# Patient Record
Sex: FEMALE | Race: WHITE | Employment: PART TIME | URBAN - METROPOLITAN AREA
[De-identification: names, ages, dates, MRNs, and addresses within clinical notes are randomized per-mention and may not be internally consistent; named-entity substitution may affect disease eponyms.]

---

## 2018-06-01 ENCOUNTER — OFFICE VISIT (OUTPATIENT)
Dept: URGENT CARE | Age: 43
End: 2018-06-01

## 2018-06-01 VITALS
BODY MASS INDEX: 32.3 KG/M2 | TEMPERATURE: 97.3 F | OXYGEN SATURATION: 98 % | RESPIRATION RATE: 16 BRPM | WEIGHT: 201 LBS | HEIGHT: 66 IN | HEART RATE: 84 BPM | DIASTOLIC BLOOD PRESSURE: 77 MMHG | SYSTOLIC BLOOD PRESSURE: 124 MMHG

## 2018-06-01 DIAGNOSIS — L21.9 SEBORRHEA: Primary | ICD-10-CM

## 2018-06-01 RX ORDER — NYSTATIN AND TRIAMCINOLONE ACETONIDE 100000; 1 [USP'U]/G; MG/G
OINTMENT TOPICAL 2 TIMES DAILY
Qty: 15 G | Refills: 0 | Status: SHIPPED | OUTPATIENT
Start: 2018-06-01 | End: 2018-12-26

## 2018-06-01 NOTE — MR AVS SNAPSHOT
Kurt Abdullahi [place]u [address] 
[phone] Patient: Romana Abad MRN: [id number] :1975 Visit Information Date & Time Provider Department Dept. Phone Encounter #  
 2018  9:00 AM [address] [facility] [phone] [id number] Follow-up Instructions Return if symptoms worsen or fail to improve, for Follow up with PCP. Upcoming Health Maintenance Date Due DTaP/Tdap/Td series (1 - Tdap) 1998 PAP AKA CERVICAL CYTOLOGY 1998 Influenza Age 5 to Adult 2018 Allergies as of 2018  Review Complete On: 2018 By: Vincent Hayden RN No Known Allergies Current Immunizations  Never Reviewed No immunizations on file. Not reviewed this visit You Were Diagnosed With   
  
 Codes Comments Seborrhea    -  Primary ICD-10-CM: L21.9 ICD-9-CM: 706.3 of Bilateral ear canal   
  
Vitals BP Pulse Temp Resp Height(growth percentile) Weight(growth percentile) 124/77 84 97.3 °F (36.3 °C) 16 5' 6\" (1.676 m) 201 lb (91.2 kg) SpO2 BMI OB Status Smoking Status 98% 32.44 kg/m2 Having regular periods Never Smoker Vitals History BMI and BSA Data Body Mass Index Body Surface Area  
 32.44 kg/m 2 2.06 m 2 Preferred Pharmacy Pharmacy Name Phone [facility], [address] [place] [address]. [phone] Your Updated Medication List  
  
   
This list is accurate as of 18  9:32 AM.  Always use your most recent med list.  
  
  
  
  
 nystatin-triamcinolone 100,000-0.1 unit/gram-% ointment Commonly known as:  Camdenance Mayer Apply  to affected area two (2) times a day. Prescriptions Sent to Pharmacy Refills  
 nystatin-triamcinolone (MYCOLOG) 100,000-0.1 unit/gram-% ointment 0 Sig: Apply  to affected area two (2) times a day.   
 Class: Normal  
 Pharmacy: [address], [address] [place] [address].  #: [phone] Route: Topical  
  
Follow-up Instructions Return if symptoms worsen or fail to improve, for Follow up with PCP. Patient Instructions Seborrheic Dermatitis: Care Instructions Your Care Instructions Seborrheic dermatitis (say \"qjz-dtz-NHN-ick iau-nlx-BB-tus\") is a skin problem that causes a reddish rash with greasy, flaky, yellow skin patches. The rash may appear on many parts of the body. It may be on the scalp, face (especially the eyebrow area and between the nose and mouth), ears, breasts, underarms, and genital area. The flaky skin on the scalp is called dandruff. This rash is often a long-term (chronic) condition. It may last for years. But the symptoms may come and go. Symptoms can be treated with special creams, shampoos, or other skin care. The cause of seborrheic dermatitis is not fully understood. It may occur when skin glands make too much oil. It may get worse in cold weather or with stress. A type of skin fungus, or yeast, may also be linked with this condition. Follow-up care is a key part of your treatment and safety. Be sure to make and go to all appointments, and call your doctor if you are having problems. It's also a good idea to know your test results and keep a list of the medicines you take. How can you care for yourself at home? · If your doctor prescribes a steroid cream, dandruff shampoo, or antifungal cream or medicine, use it as directed. If your doctor prescribes other medicine, take it as directed. · Use a dandruff shampoo if seborrheic dermatitis affects your scalp. This includes Head & Shoulders, Sebulex, and Selsun Blue. You may need to try a few kinds of shampoo to find the one that works best for you. · To help with itching: ¨ Use hydrocortisone cream. Follow the directions on the label. ¨ Use cold, wet cloths. ¨ Take an over-the-counter antihistamine, such as diphenhydramine (Benadryl) or loratadine (Claritin). Read and follow all instructions on the label. When should you call for help? Call your doctor now or seek immediate medical care if: 
? · You have signs of infection, such as: 
¨ Increased pain, swelling, warmth, or redness. ¨ Red streaks leading from the rash. ¨ Pus draining from the rash. ¨ A fever. ? Watch closely for changes in your health, and be sure to contact your doctor if: 
? · The rash gets worse or spreads to other parts of your body. ? · You do not get better as expected. Where can you learn more? Go to http://layne-tarik.info/. Enter W668 in the search box to learn more about \"Seborrheic Dermatitis: Care Instructions. \" Current as of: October 13, 2016 Content Version: 11.4 © 1716-0232 [employer]. Care instructions adapted under license by [employer] (which disclaims liability or warranty for this information). If you have questions about a medical condition or this instruction, always ask your healthcare professional. [place] [address] any warranty or liability for your use of this information. Introducing hospitals & HEALTH SERVICES! [facility] introduces [employer] patient portal. Now you can access parts of your medical record, email your doctor's office, and request medication refills online. 1. In your internet browser, go to [email or web address] 2. Click on the First Time User? Click Here link in the Sign In box. You will see the New Member Sign Up page. 3. Enter your [employer] Systems Access Code exactly as it appears below. You will not need to use this code after youve completed the sign-up process. If you do not sign up before the expiration date, you must request a new code. · [employer] Access Code: [email or web address] Expires: 8/30/2018  8:59 AM 
 
 4. Enter the last four digits of your Social Security Number (xxxx) and Date of Birth (mm/dd/yyyy) as indicated and click Submit. You will be taken to the next sign-up page. 5. Create a JÃ¡ Entendi ID. This will be your J[employer] login ID and cannot be changed, so think of one that is secure and easy to remember. 6. Create a J[employer] Entendi password. You can change your password at any time. 7. Enter your Password Reset Question and Answer. This can be used at a later time if you forget your password. 8. Enter your e-mail address. You will receive e-mail notification when new information is available in [address] 9. Click Sign Up. You can now view and download portions of your medical record. 10. Click the Download Summary menu link to download a portable copy of your medical information. If you have questions, please visit the Frequently Asked Questions section of the [employer] website. Remember, [employer] is NOT to be used for urgent needs. For medical emergencies, dial 911. Now available from your iPhone and Android! Please provide this summary of care documentation to your next provider. Your primary care clinician is listed as Joshua Knowles. If you have any questions after today's visit, please call [phone].

## 2018-06-01 NOTE — PROGRESS NOTES
Patient is a 38 y.o. female presenting with skin problem. Skin Problem   This is a new problem. The current episode started more than 1 week ago (itching inside ear canal and irritation). The problem occurs daily. The problem has not changed since onset. Associated symptoms comments: No ear pain/ drainage  No h/o manipulation . She has tried nothing for the symptoms. Past Medical History:   Diagnosis Date    Hemorrhoids     Scoliosis         Past Surgical History:   Procedure Laterality Date    HX OTHER SURGICAL      IUD PERFORATION, 2008    HX OTHER SURGICAL      D&C, 2005         Family History   Problem Relation Age of Onset    Thyroid Disease Mother     Other Father      HIGH CHOLESTEROL    Seizures Brother     Cancer Maternal Grandmother      BREAST    Heart Disease Maternal Grandfather     Hypertension Paternal Grandmother     Other Paternal Grandmother      HIGH CHOLESTROLE    Heart Disease Paternal Grandfather         Social History     Social History    Marital status:      Spouse name: N/A    Number of children: N/A    Years of education: N/A     Occupational History    Not on file. Social History Main Topics    Smoking status: Never Smoker    Smokeless tobacco: Never Used    Alcohol use No    Drug use: No    Sexual activity: Yes     Partners: Male     Other Topics Concern    Not on file     Social History Narrative                ALLERGIES: Review of patient's allergies indicates no known allergies. Review of Systems   All other systems reviewed and are negative. Vitals:    06/01/18 0907   BP: 124/77   Pulse: 84   Resp: 16   Temp: 97.3 °F (36.3 °C)   SpO2: 98%   Weight: 201 lb (91.2 kg)   Height: 5' 6\" (1.676 m)       Physical Exam   HENT:   Right Ear: Tympanic membrane and external ear normal. No drainage. Left Ear: Tympanic membrane normal. No drainage.    Dry- flaky skin of ear canal with irritation  No s/o external eat infection MDM    Procedures    ICD-10-CM ICD-9-CM    1. Seborrhea L21.9 706.3     of Bilateral ear canal      Medications Ordered Today   Medications    nystatin-triamcinolone (MYCOLOG) 100,000-0.1 unit/gram-% ointment     Sig: Apply  to affected area two (2) times a day. Dispense:  15 g     Refill:  0     No results found for any visits on 06/01/18. The patients condition was discussed with the patient and they understand. The patient is to follow up with primary care doctor. If signs and symptoms become worse the pt is to go to the ER. The patient is to take medications as prescribed.

## 2018-06-01 NOTE — PATIENT INSTRUCTIONS
Seborrheic Dermatitis: Care Instructions  Your Care Instructions  Seborrheic dermatitis (say \"kzn-kns-MWG-ick qzl-rhb-VA-tus\") is a skin problem that causes a reddish rash with greasy, flaky, yellow skin patches. The rash may appear on many parts of the body. It may be on the scalp, face (especially the eyebrow area and between the nose and mouth), ears, breasts, underarms, and genital area. The flaky skin on the scalp is called dandruff. This rash is often a long-term (chronic) condition. It may last for years. But the symptoms may come and go. Symptoms can be treated with special creams, shampoos, or other skin care. The cause of seborrheic dermatitis is not fully understood. It may occur when skin glands make too much oil. It may get worse in cold weather or with stress. A type of skin fungus, or yeast, may also be linked with this condition. Follow-up care is a key part of your treatment and safety. Be sure to make and go to all appointments, and call your doctor if you are having problems. It's also a good idea to know your test results and keep a list of the medicines you take. How can you care for yourself at home? · If your doctor prescribes a steroid cream, dandruff shampoo, or antifungal cream or medicine, use it as directed. If your doctor prescribes other medicine, take it as directed. · Use a dandruff shampoo if seborrheic dermatitis affects your scalp. This includes Head & Shoulders, Sebulex, and Selsun Blue. You may need to try a few kinds of shampoo to find the one that works best for you. · To help with itching:  ¨ Use hydrocortisone cream. Follow the directions on the label. ¨ Use cold, wet cloths. ¨ Take an over-the-counter antihistamine, such as diphenhydramine (Benadryl) or loratadine (Claritin). Read and follow all instructions on the label. When should you call for help?   Call your doctor now or seek immediate medical care if:  ? · You have signs of infection, such as:  ¨ Increased pain, swelling, warmth, or redness. ¨ Red streaks leading from the rash. ¨ Pus draining from the rash. ¨ A fever. ? Watch closely for changes in your health, and be sure to contact your doctor if:  ? · The rash gets worse or spreads to other parts of your body. ? · You do not get better as expected. Where can you learn more? Go to http://layne-tarik.info/. Enter O334 in the search box to learn more about \"Seborrheic Dermatitis: Care Instructions. \"  Current as of: October 13, 2016  Content Version: 11.4  © 8868-0602 [employer]. Care instructions adapted under license by [employer] (which disclaims liability or warranty for this information). If you have questions about a medical condition or this instruction, always ask your healthcare professional. [address] any warranty or liability for your use of this information.

## 2018-12-26 ENCOUNTER — HOSPITAL ENCOUNTER (OUTPATIENT)
Dept: PREADMISSION TESTING | Age: 43
Discharge: HOME | End: 2018-12-26
Payer: COMMERCIAL

## 2018-12-26 VITALS
WEIGHT: 202 LBS | BODY MASS INDEX: 32.47 KG/M2 | HEIGHT: 66 IN | DIASTOLIC BLOOD PRESSURE: 86 MMHG | TEMPERATURE: 97.7 F | HEART RATE: 71 BPM | SYSTOLIC BLOOD PRESSURE: 133 MMHG

## 2018-12-26 LAB
ALBUMIN SERPL-MCNC: 3.7 G/DL (ref 3.5–5)
ALBUMIN/GLOB SERPL: 1 {RATIO} (ref 1.1–2.2)
ALP SERPL-CCNC: 92 U/L (ref 45–117)
ALT SERPL-CCNC: 25 U/L (ref 12–78)
ANION GAP SERPL CALC-SCNC: 5 MMOL/L (ref 5–15)
APPEARANCE UR: ABNORMAL
APTT PPP: 30.9 SEC (ref 22.1–32)
AST SERPL-CCNC: 18 U/L (ref 15–37)
BACTERIA URNS QL MICRO: ABNORMAL /HPF
BASOPHILS # BLD: 0 K/UL (ref 0–0.1)
BASOPHILS NFR BLD: 1 % (ref 0–1)
BILIRUB SERPL-MCNC: 1.2 MG/DL (ref 0.2–1)
BILIRUB UR QL: NEGATIVE
BUN SERPL-MCNC: 14 MG/DL (ref 6–20)
BUN/CREAT SERPL: 17 (ref 12–20)
CALCIUM SERPL-MCNC: 9 MG/DL (ref 8.5–10.1)
CHLORIDE SERPL-SCNC: 105 MMOL/L (ref 97–108)
CO2 SERPL-SCNC: 28 MMOL/L (ref 21–32)
COLOR UR: ABNORMAL
CREAT SERPL-MCNC: 0.83 MG/DL (ref 0.55–1.02)
DIFFERENTIAL METHOD BLD: NORMAL
EOSINOPHIL # BLD: 0.1 K/UL (ref 0–0.4)
EOSINOPHIL NFR BLD: 2 % (ref 0–7)
EPITH CASTS URNS QL MICRO: ABNORMAL /LPF
ERYTHROCYTE [DISTWIDTH] IN BLOOD BY AUTOMATED COUNT: 12.6 % (ref 11.5–14.5)
GLOBULIN SER CALC-MCNC: 3.8 G/DL (ref 2–4)
GLUCOSE SERPL-MCNC: 91 MG/DL (ref 65–100)
GLUCOSE UR STRIP.AUTO-MCNC: NEGATIVE MG/DL
HCG SERPL QL: NEGATIVE
HCT VFR BLD AUTO: 43.3 % (ref 35–47)
HGB BLD-MCNC: 13.8 G/DL (ref 11.5–16)
HGB UR QL STRIP: ABNORMAL
HYALINE CASTS URNS QL MICRO: ABNORMAL /LPF (ref 0–5)
IMM GRANULOCYTES # BLD: 0 K/UL (ref 0–0.04)
IMM GRANULOCYTES NFR BLD AUTO: 0 % (ref 0–0.5)
INR PPP: 1 (ref 0.9–1.1)
KETONES UR QL STRIP.AUTO: NEGATIVE MG/DL
LEUKOCYTE ESTERASE UR QL STRIP.AUTO: ABNORMAL
LYMPHOCYTES # BLD: 2.3 K/UL (ref 0.8–3.5)
LYMPHOCYTES NFR BLD: 33 % (ref 12–49)
MCH RBC QN AUTO: 29.1 PG (ref 26–34)
MCHC RBC AUTO-ENTMCNC: 31.9 G/DL (ref 30–36.5)
MCV RBC AUTO: 91.2 FL (ref 80–99)
MONOCYTES # BLD: 0.6 K/UL (ref 0–1)
MONOCYTES NFR BLD: 9 % (ref 5–13)
NEUTS SEG # BLD: 3.9 K/UL (ref 1.8–8)
NEUTS SEG NFR BLD: 56 % (ref 32–75)
NITRITE UR QL STRIP.AUTO: NEGATIVE
NRBC # BLD: 0 K/UL (ref 0–0.01)
NRBC BLD-RTO: 0 PER 100 WBC
PH UR STRIP: 6 [PH] (ref 5–8)
PLATELET # BLD AUTO: 243 K/UL (ref 150–400)
PMV BLD AUTO: 10.9 FL (ref 8.9–12.9)
POTASSIUM SERPL-SCNC: 4.3 MMOL/L (ref 3.5–5.1)
PROT SERPL-MCNC: 7.5 G/DL (ref 6.4–8.2)
PROT UR STRIP-MCNC: NEGATIVE MG/DL
PROTHROMBIN TIME: 10.1 SEC (ref 9–11.1)
RBC # BLD AUTO: 4.75 M/UL (ref 3.8–5.2)
RBC #/AREA URNS HPF: ABNORMAL /HPF (ref 0–5)
SODIUM SERPL-SCNC: 138 MMOL/L (ref 136–145)
SP GR UR REFRACTOMETRY: 1.03 (ref 1–1.03)
THERAPEUTIC RANGE,PTTT: NORMAL SECS (ref 58–77)
UA: UC IF INDICATED,UAUC: ABNORMAL
UROBILINOGEN UR QL STRIP.AUTO: 1 EU/DL (ref 0.2–1)
WBC # BLD AUTO: 7 K/UL (ref 3.6–11)
WBC URNS QL MICRO: ABNORMAL /HPF (ref 0–4)

## 2018-12-26 PROCEDURE — 81001 URINALYSIS AUTO W/SCOPE: CPT

## 2018-12-26 PROCEDURE — 84703 CHORIONIC GONADOTROPIN ASSAY: CPT

## 2018-12-26 PROCEDURE — 86901 BLOOD TYPING SEROLOGIC RH(D): CPT

## 2018-12-26 PROCEDURE — 87086 URINE CULTURE/COLONY COUNT: CPT

## 2018-12-26 PROCEDURE — 85730 THROMBOPLASTIN TIME PARTIAL: CPT

## 2018-12-26 PROCEDURE — 85025 COMPLETE CBC W/AUTO DIFF WBC: CPT

## 2018-12-26 PROCEDURE — 36415 COLL VENOUS BLD VENIPUNCTURE: CPT

## 2018-12-26 PROCEDURE — 80053 COMPREHEN METABOLIC PANEL: CPT

## 2018-12-26 PROCEDURE — 85610 PROTHROMBIN TIME: CPT

## 2018-12-26 RX ORDER — IBUPROFEN 200 MG
400 TABLET ORAL AS NEEDED
COMMUNITY
End: 2019-01-15

## 2018-12-27 LAB
ABO + RH BLD: NORMAL
BACTERIA SPEC CULT: NORMAL
BACTERIA SPEC CULT: NORMAL
BLOOD GROUP ANTIBODIES SERPL: NORMAL
SERVICE CMNT-IMP: NORMAL
SPECIMEN EXP DATE BLD: NORMAL

## 2018-12-29 LAB
BACTERIA SPEC CULT: ABNORMAL
CC UR VC: ABNORMAL
SERVICE CMNT-IMP: ABNORMAL

## 2018-12-31 NOTE — PERIOP NOTES
Faxed PAT testing reports to Dr. Caprice Young. Voicemail message left for Vicki/Dr. Garner's office  RE: abnormal UA culture still pending.

## 2019-01-01 LAB
ANTIMICROBIAL SUSCEPTIBILITY, 080575: ABNORMAL
BACTERIA ISLT: NORMAL
RESULT 1, 080571: ABNORMAL
SPECIMEN SOURCE: NORMAL

## 2019-01-02 PROBLEM — R82.79 URINE CULTURE POSITIVE: Status: ACTIVE | Noted: 2019-01-02

## 2019-01-02 RX ORDER — CEPHALEXIN 250 MG/1
500 CAPSULE ORAL 2 TIMES DAILY
Qty: 20 CAP | Refills: 0 | Status: SHIPPED | OUTPATIENT
Start: 2019-01-02 | End: 2019-01-07

## 2019-01-02 RX ORDER — CEPHALEXIN 250 MG/1
500 CAPSULE ORAL 2 TIMES DAILY
Qty: 28 CAP | Refills: 0 | Status: SHIPPED | OUTPATIENT
Start: 2019-01-02 | End: 2019-01-02 | Stop reason: SDUPTHER

## 2019-01-02 NOTE — ADVANCED PRACTICE NURSE
Patient is a 42 y/o female who was seen by the PAT RN as a standard pre-op appointment on 12/26. Urine sent to outside lab d/t difficulty identifying bacteria. Reviewed urine culture results on 1/2/19 and results were >100,000 colonies E. Coli. Prescription for cephalexin 500 mg bid x 5 days e-prescribed to [address] . Patient notified of prescription and possible side effects, and verbalized understanding of treatment regimen, goals of therapy, and UTI preventive measures. Provided contact info for further questions and reasons to follow up with PCP/surgeon, if needed.  
 
LILIANE Casillas

## 2019-01-03 ENCOUNTER — ANESTHESIA EVENT (OUTPATIENT)
Dept: SURGERY | Age: 44
DRG: 458 | End: 2019-01-03
Payer: COMMERCIAL

## 2019-01-09 ENCOUNTER — HOSPITAL ENCOUNTER (INPATIENT)
Age: 44
LOS: 6 days | Discharge: HOME HEALTH | DRG: 458 | End: 2019-01-15
Attending: ORTHOPAEDIC SURGERY | Admitting: ORTHOPAEDIC SURGERY
Payer: COMMERCIAL

## 2019-01-09 ENCOUNTER — APPOINTMENT (OUTPATIENT)
Dept: GENERAL RADIOLOGY | Age: 44
DRG: 458 | End: 2019-01-09
Attending: ORTHOPAEDIC SURGERY
Payer: COMMERCIAL

## 2019-01-09 ENCOUNTER — ANESTHESIA (OUTPATIENT)
Dept: SURGERY | Age: 44
DRG: 458 | End: 2019-01-09
Payer: COMMERCIAL

## 2019-01-09 DIAGNOSIS — M41.9 SCOLIOSIS OF THORACOLUMBAR SPINE, UNSPECIFIED SCOLIOSIS TYPE: Primary | ICD-10-CM

## 2019-01-09 LAB
GLUCOSE BLD STRIP.AUTO-MCNC: 101 MG/DL (ref 65–100)
HCG UR QL: NEGATIVE
HCT VFR BLD AUTO: 34.1 % (ref 35–47)
HGB BLD-MCNC: 10.9 G/DL (ref 11.5–16)
SERVICE CMNT-IMP: ABNORMAL

## 2019-01-09 PROCEDURE — 74011250636 HC RX REV CODE- 250/636: Performed by: ANESTHESIOLOGY

## 2019-01-09 PROCEDURE — 72020 X-RAY EXAM OF SPINE 1 VIEW: CPT

## 2019-01-09 PROCEDURE — C1713 ANCHOR/SCREW BN/BN,TIS/BN: HCPCS | Performed by: ORTHOPAEDIC SURGERY

## 2019-01-09 PROCEDURE — 77030011640 HC PAD GRND REM COVD -A: Performed by: ORTHOPAEDIC SURGERY

## 2019-01-09 PROCEDURE — 36415 COLL VENOUS BLD VENIPUNCTURE: CPT

## 2019-01-09 PROCEDURE — 77030020782 HC GWN BAIR PAWS FLX 3M -B

## 2019-01-09 PROCEDURE — 85018 HEMOGLOBIN: CPT

## 2019-01-09 PROCEDURE — 77030033067 HC SUT PDO STRATFX SPIR J&J -B: Performed by: ORTHOPAEDIC SURGERY

## 2019-01-09 PROCEDURE — 74011000250 HC RX REV CODE- 250

## 2019-01-09 PROCEDURE — 77030039267 HC ADH SKN EXOFIN S2SG -B: Performed by: ORTHOPAEDIC SURGERY

## 2019-01-09 PROCEDURE — 77030026438 HC STYL ET INTUB CARD -A: Performed by: ANESTHESIOLOGY

## 2019-01-09 PROCEDURE — 74011000258 HC RX REV CODE- 258

## 2019-01-09 PROCEDURE — 74011250637 HC RX REV CODE- 250/637: Performed by: NURSE PRACTITIONER

## 2019-01-09 PROCEDURE — 77030033138 HC SUT PGA STRATFX J&J -B: Performed by: ORTHOPAEDIC SURGERY

## 2019-01-09 PROCEDURE — 74011250636 HC RX REV CODE- 250/636: Performed by: NURSE PRACTITIONER

## 2019-01-09 PROCEDURE — 77030018836 HC SOL IRR NACL ICUM -A: Performed by: ORTHOPAEDIC SURGERY

## 2019-01-09 PROCEDURE — 74011000250 HC RX REV CODE- 250: Performed by: NURSE PRACTITIONER

## 2019-01-09 PROCEDURE — 74011250636 HC RX REV CODE- 250/636

## 2019-01-09 PROCEDURE — 77030022940 HC BIT DRL DISP AMED -B: Performed by: ORTHOPAEDIC SURGERY

## 2019-01-09 PROCEDURE — 74011000250 HC RX REV CODE- 250: Performed by: ORTHOPAEDIC SURGERY

## 2019-01-09 PROCEDURE — 74011250636 HC RX REV CODE- 250/636: Performed by: ORTHOPAEDIC SURGERY

## 2019-01-09 PROCEDURE — 65270000029 HC RM PRIVATE

## 2019-01-09 PROCEDURE — 0QS004Z REPOSITION LUMBAR VERTEBRA WITH INTERNAL FIXATION DEVICE, OPEN APPROACH: ICD-10-PCS | Performed by: ORTHOPAEDIC SURGERY

## 2019-01-09 PROCEDURE — 0SG10K1 FUSION OF 2 OR MORE LUMBAR VERTEBRAL JOINTS WITH NONAUTOLOGOUS TISSUE SUBSTITUTE, POSTERIOR APPROACH, POSTERIOR COLUMN, OPEN APPROACH: ICD-10-PCS | Performed by: ORTHOPAEDIC SURGERY

## 2019-01-09 PROCEDURE — 76000 FLUOROSCOPY <1 HR PHYS/QHP: CPT

## 2019-01-09 PROCEDURE — 77030034850: Performed by: ORTHOPAEDIC SURGERY

## 2019-01-09 PROCEDURE — 76210000002 HC OR PH I REC 3 TO 3.5 HR: Performed by: ORTHOPAEDIC SURGERY

## 2019-01-09 PROCEDURE — 77030005402 HC CATH RAD ART LN KT TELE -B

## 2019-01-09 PROCEDURE — 0RG80K1 FUSION OF 8 OR MORE THORACIC VERTEBRAL JOINTS WITH NONAUTOLOGOUS TISSUE SUBSTITUTE, POSTERIOR APPROACH, POSTERIOR COLUMN, OPEN APPROACH: ICD-10-PCS | Performed by: ORTHOPAEDIC SURGERY

## 2019-01-09 PROCEDURE — 77030005401 HC CATH RAD ARRO -A: Performed by: ANESTHESIOLOGY

## 2019-01-09 PROCEDURE — 77030013797 HC KT TRNSDUC PRSSR EDWD -A

## 2019-01-09 PROCEDURE — P9045 ALBUMIN (HUMAN), 5%, 250 ML: HCPCS

## 2019-01-09 PROCEDURE — 0RGA0K1 FUSION OF THORACOLUMBAR VERTEBRAL JOINT WITH NONAUTOLOGOUS TISSUE SUBSTITUTE, POSTERIOR APPROACH, POSTERIOR COLUMN, OPEN APPROACH: ICD-10-PCS | Performed by: ORTHOPAEDIC SURGERY

## 2019-01-09 PROCEDURE — 82962 GLUCOSE BLOOD TEST: CPT

## 2019-01-09 PROCEDURE — 77030008684 HC TU ET CUF COVD -B: Performed by: ANESTHESIOLOGY

## 2019-01-09 PROCEDURE — 74011250637 HC RX REV CODE- 250/637: Performed by: ORTHOPAEDIC SURGERY

## 2019-01-09 PROCEDURE — 77030020263 HC SOL INJ SOD CL0.9% LFCR 1000ML: Performed by: ORTHOPAEDIC SURGERY

## 2019-01-09 PROCEDURE — 81025 URINE PREGNANCY TEST: CPT

## 2019-01-09 PROCEDURE — 0PS40ZZ REPOSITION THORACIC VERTEBRA, OPEN APPROACH: ICD-10-PCS | Performed by: ORTHOPAEDIC SURGERY

## 2019-01-09 PROCEDURE — 76010000177 HC OR TIME 5 TO 5.5 HR INTENSV-TIER 1: Performed by: ORTHOPAEDIC SURGERY

## 2019-01-09 PROCEDURE — 77030029099 HC BN WAX SSPC -A: Performed by: ORTHOPAEDIC SURGERY

## 2019-01-09 PROCEDURE — 77030013079 HC BLNKT BAIR HGGR 3M -A: Performed by: ANESTHESIOLOGY

## 2019-01-09 PROCEDURE — 76060000041 HC ANESTHESIA 5 TO 5.5 HR: Performed by: ORTHOPAEDIC SURGERY

## 2019-01-09 PROCEDURE — 77030004435 HC BUR RND STRY -C: Performed by: ORTHOPAEDIC SURGERY

## 2019-01-09 PROCEDURE — 77030031139 HC SUT VCRL2 J&J -A: Performed by: ORTHOPAEDIC SURGERY

## 2019-01-09 PROCEDURE — 74011000272 HC RX REV CODE- 272: Performed by: ORTHOPAEDIC SURGERY

## 2019-01-09 PROCEDURE — 0SG30K1 FUSION OF LUMBOSACRAL JOINT WITH NONAUTOLOGOUS TISSUE SUBSTITUTE, POSTERIOR APPROACH, POSTERIOR COLUMN, OPEN APPROACH: ICD-10-PCS | Performed by: ORTHOPAEDIC SURGERY

## 2019-01-09 PROCEDURE — 77030034475 HC MISC IMPL SPN: Performed by: ORTHOPAEDIC SURGERY

## 2019-01-09 DEVICE — SCREW SPNL REDUCTION 4.5X35 MM TRPL LD THRD: Type: IMPLANTABLE DEVICE | Site: BACK | Status: FUNCTIONAL

## 2019-01-09 DEVICE — PEDICLE BLOCKER STANDARD (FOR USE WITH 4.5MM - 7.2MM SCREWS)
Type: IMPLANTABLE DEVICE | Site: BACK | Status: FUNCTIONAL
Brand: PREFERENCE

## 2019-01-09 DEVICE — 6.5MM TRIPLE-LEAD SCREW REDUCTION 40MM DK BLUE
Type: IMPLANTABLE DEVICE | Site: BACK | Status: FUNCTIONAL
Brand: PREFERENCE

## 2019-01-09 DEVICE — 5.5MM TRIPLE-LEAD SCREW REDUCTION 35MM LT BLUE
Type: IMPLANTABLE DEVICE | Site: BACK | Status: FUNCTIONAL
Brand: PREFERENCE

## 2019-01-09 DEVICE — 5.5MM TRIPLE-LEAD SCREW REDUCTION 45MM LT BLUE
Type: IMPLANTABLE DEVICE | Site: BACK | Status: FUNCTIONAL
Brand: PREFERENCE

## 2019-01-09 DEVICE — SYNTHETIC BONE VOID FILLER FOR ORTHOPEDIC APPLICATIONS
Type: IMPLANTABLE DEVICE | Site: BACK | Status: FUNCTIONAL
Brand: POROUS MORSELS 1-2MM 10.0CC

## 2019-01-09 DEVICE — 5.5MM STRAIGHT ROD 450MM COCR EXTERNAL HEX
Type: IMPLANTABLE DEVICE | Site: BACK | Status: FUNCTIONAL
Brand: TAURUS

## 2019-01-09 DEVICE — 6.5MM TRIPLE-LEAD SCREW REDUCTION 35MM DK BLUE
Type: IMPLANTABLE DEVICE | Site: BACK | Status: FUNCTIONAL
Brand: PREFERENCE

## 2019-01-09 RX ORDER — DIAZEPAM 5 MG/1
5 TABLET ORAL
Status: DISCONTINUED | OUTPATIENT
Start: 2019-01-09 | End: 2019-01-15 | Stop reason: HOSPADM

## 2019-01-09 RX ORDER — AMOXICILLIN 250 MG
1 CAPSULE ORAL 2 TIMES DAILY
Status: DISCONTINUED | OUTPATIENT
Start: 2019-01-09 | End: 2019-01-15 | Stop reason: HOSPADM

## 2019-01-09 RX ORDER — CEFAZOLIN SODIUM/WATER 2 G/20 ML
2 SYRINGE (ML) INTRAVENOUS
Status: COMPLETED | OUTPATIENT
Start: 2019-01-09 | End: 2019-01-09

## 2019-01-09 RX ORDER — FENTANYL CITRATE 50 UG/ML
INJECTION, SOLUTION INTRAMUSCULAR; INTRAVENOUS AS NEEDED
Status: DISCONTINUED | OUTPATIENT
Start: 2019-01-09 | End: 2019-01-09 | Stop reason: HOSPADM

## 2019-01-09 RX ORDER — DIPHENHYDRAMINE HCL 12.5MG/5ML
12.5 ELIXIR ORAL
Status: ACTIVE | OUTPATIENT
Start: 2019-01-09 | End: 2019-01-10

## 2019-01-09 RX ORDER — LIDOCAINE HYDROCHLORIDE 10 MG/ML
0.1 INJECTION, SOLUTION EPIDURAL; INFILTRATION; INTRACAUDAL; PERINEURAL AS NEEDED
Status: DISCONTINUED | OUTPATIENT
Start: 2019-01-09 | End: 2019-01-09 | Stop reason: HOSPADM

## 2019-01-09 RX ORDER — HYDROMORPHONE HCL/0.9% NACL/PF 0.5 MG/ML
PLASTIC BAG, INJECTION (ML) INTRAVENOUS
Status: DISCONTINUED | OUTPATIENT
Start: 2019-01-09 | End: 2019-01-10

## 2019-01-09 RX ORDER — VANCOMYCIN HYDROCHLORIDE 1 G/20ML
INJECTION, POWDER, LYOPHILIZED, FOR SOLUTION INTRAVENOUS AS NEEDED
Status: DISCONTINUED | OUTPATIENT
Start: 2019-01-09 | End: 2019-01-09 | Stop reason: HOSPADM

## 2019-01-09 RX ORDER — HYDROMORPHONE HYDROCHLORIDE 2 MG/ML
INJECTION, SOLUTION INTRAMUSCULAR; INTRAVENOUS; SUBCUTANEOUS AS NEEDED
Status: DISCONTINUED | OUTPATIENT
Start: 2019-01-09 | End: 2019-01-09 | Stop reason: HOSPADM

## 2019-01-09 RX ORDER — ACETAMINOPHEN 325 MG/1
650 TABLET ORAL EVERY 6 HOURS
Status: DISCONTINUED | OUTPATIENT
Start: 2019-01-09 | End: 2019-01-15 | Stop reason: HOSPADM

## 2019-01-09 RX ORDER — PROPOFOL 10 MG/ML
INJECTION, EMULSION INTRAVENOUS AS NEEDED
Status: DISCONTINUED | OUTPATIENT
Start: 2019-01-09 | End: 2019-01-09 | Stop reason: HOSPADM

## 2019-01-09 RX ORDER — SODIUM CHLORIDE 0.9 % (FLUSH) 0.9 %
5-40 SYRINGE (ML) INJECTION AS NEEDED
Status: DISCONTINUED | OUTPATIENT
Start: 2019-01-09 | End: 2019-01-15 | Stop reason: HOSPADM

## 2019-01-09 RX ORDER — POLYETHYLENE GLYCOL 3350 17 G/17G
17 POWDER, FOR SOLUTION ORAL DAILY
Status: DISCONTINUED | OUTPATIENT
Start: 2019-01-10 | End: 2019-01-15 | Stop reason: HOSPADM

## 2019-01-09 RX ORDER — MIDAZOLAM HYDROCHLORIDE 1 MG/ML
2 INJECTION, SOLUTION INTRAMUSCULAR; INTRAVENOUS ONCE
Status: COMPLETED | OUTPATIENT
Start: 2019-01-09 | End: 2019-01-09

## 2019-01-09 RX ORDER — KETAMINE HYDROCHLORIDE 10 MG/ML
INJECTION, SOLUTION INTRAMUSCULAR; INTRAVENOUS AS NEEDED
Status: DISCONTINUED | OUTPATIENT
Start: 2019-01-09 | End: 2019-01-09 | Stop reason: HOSPADM

## 2019-01-09 RX ORDER — ONDANSETRON 2 MG/ML
INJECTION INTRAMUSCULAR; INTRAVENOUS AS NEEDED
Status: DISCONTINUED | OUTPATIENT
Start: 2019-01-09 | End: 2019-01-09 | Stop reason: HOSPADM

## 2019-01-09 RX ORDER — MIDAZOLAM HYDROCHLORIDE 1 MG/ML
0.5 INJECTION, SOLUTION INTRAMUSCULAR; INTRAVENOUS
Status: COMPLETED | OUTPATIENT
Start: 2019-01-09 | End: 2019-01-09

## 2019-01-09 RX ORDER — CLINDAMYCIN PHOSPHATE 900 MG/50ML
900 INJECTION INTRAVENOUS EVERY 8 HOURS
Status: COMPLETED | OUTPATIENT
Start: 2019-01-09 | End: 2019-01-10

## 2019-01-09 RX ORDER — OXYCODONE HYDROCHLORIDE 5 MG/1
5 TABLET ORAL
Status: DISCONTINUED | OUTPATIENT
Start: 2019-01-09 | End: 2019-01-10

## 2019-01-09 RX ORDER — NALOXONE HYDROCHLORIDE 0.4 MG/ML
0.4 INJECTION, SOLUTION INTRAMUSCULAR; INTRAVENOUS; SUBCUTANEOUS AS NEEDED
Status: DISCONTINUED | OUTPATIENT
Start: 2019-01-09 | End: 2019-01-15 | Stop reason: HOSPADM

## 2019-01-09 RX ORDER — SCOLOPAMINE TRANSDERMAL SYSTEM 1 MG/1
1 PATCH, EXTENDED RELEASE TRANSDERMAL
Status: DISCONTINUED | OUTPATIENT
Start: 2019-01-09 | End: 2019-01-11

## 2019-01-09 RX ORDER — SODIUM CHLORIDE, SODIUM LACTATE, POTASSIUM CHLORIDE, CALCIUM CHLORIDE 600; 310; 30; 20 MG/100ML; MG/100ML; MG/100ML; MG/100ML
100 INJECTION, SOLUTION INTRAVENOUS CONTINUOUS
Status: DISCONTINUED | OUTPATIENT
Start: 2019-01-09 | End: 2019-01-09 | Stop reason: HOSPADM

## 2019-01-09 RX ORDER — SODIUM CHLORIDE 9 MG/ML
125 INJECTION, SOLUTION INTRAVENOUS CONTINUOUS
Status: DISCONTINUED | OUTPATIENT
Start: 2019-01-09 | End: 2019-01-10

## 2019-01-09 RX ORDER — SODIUM CHLORIDE, SODIUM LACTATE, POTASSIUM CHLORIDE, CALCIUM CHLORIDE 600; 310; 30; 20 MG/100ML; MG/100ML; MG/100ML; MG/100ML
25 INJECTION, SOLUTION INTRAVENOUS CONTINUOUS
Status: DISCONTINUED | OUTPATIENT
Start: 2019-01-09 | End: 2019-01-09 | Stop reason: HOSPADM

## 2019-01-09 RX ORDER — ALBUMIN HUMAN 50 G/1000ML
SOLUTION INTRAVENOUS AS NEEDED
Status: DISCONTINUED | OUTPATIENT
Start: 2019-01-09 | End: 2019-01-09 | Stop reason: HOSPADM

## 2019-01-09 RX ORDER — CEFAZOLIN SODIUM 1 G/3ML
INJECTION, POWDER, FOR SOLUTION INTRAMUSCULAR; INTRAVENOUS AS NEEDED
Status: DISCONTINUED | OUTPATIENT
Start: 2019-01-09 | End: 2019-01-09

## 2019-01-09 RX ORDER — FENTANYL CITRATE 50 UG/ML
50 INJECTION, SOLUTION INTRAMUSCULAR; INTRAVENOUS AS NEEDED
Status: DISCONTINUED | OUTPATIENT
Start: 2019-01-09 | End: 2019-01-09 | Stop reason: HOSPADM

## 2019-01-09 RX ORDER — PROPOFOL 10 MG/ML
INJECTION, EMULSION INTRAVENOUS
Status: DISCONTINUED | OUTPATIENT
Start: 2019-01-09 | End: 2019-01-09 | Stop reason: HOSPADM

## 2019-01-09 RX ORDER — LIDOCAINE HYDROCHLORIDE 20 MG/ML
INJECTION, SOLUTION EPIDURAL; INFILTRATION; INTRACAUDAL; PERINEURAL AS NEEDED
Status: DISCONTINUED | OUTPATIENT
Start: 2019-01-09 | End: 2019-01-09 | Stop reason: HOSPADM

## 2019-01-09 RX ORDER — DIAZEPAM 10 MG/2ML
INJECTION INTRAMUSCULAR
Status: COMPLETED
Start: 2019-01-09 | End: 2019-01-09

## 2019-01-09 RX ORDER — ROPIVACAINE HYDROCHLORIDE 5 MG/ML
150 INJECTION, SOLUTION EPIDURAL; INFILTRATION; PERINEURAL AS NEEDED
Status: DISCONTINUED | OUTPATIENT
Start: 2019-01-09 | End: 2019-01-09 | Stop reason: HOSPADM

## 2019-01-09 RX ORDER — SUCCINYLCHOLINE CHLORIDE 20 MG/ML
INJECTION INTRAMUSCULAR; INTRAVENOUS AS NEEDED
Status: DISCONTINUED | OUTPATIENT
Start: 2019-01-09 | End: 2019-01-09 | Stop reason: HOSPADM

## 2019-01-09 RX ORDER — ENOXAPARIN SODIUM 100 MG/ML
40 INJECTION SUBCUTANEOUS DAILY
Status: DISCONTINUED | OUTPATIENT
Start: 2019-01-10 | End: 2019-01-09

## 2019-01-09 RX ORDER — SODIUM CHLORIDE 0.9 % (FLUSH) 0.9 %
5-40 SYRINGE (ML) INJECTION EVERY 8 HOURS
Status: DISCONTINUED | OUTPATIENT
Start: 2019-01-09 | End: 2019-01-09 | Stop reason: HOSPADM

## 2019-01-09 RX ORDER — SODIUM CHLORIDE 0.9 % (FLUSH) 0.9 %
5-40 SYRINGE (ML) INJECTION EVERY 8 HOURS
Status: DISCONTINUED | OUTPATIENT
Start: 2019-01-09 | End: 2019-01-15 | Stop reason: HOSPADM

## 2019-01-09 RX ORDER — FACIAL-BODY WIPES
10 EACH TOPICAL DAILY PRN
Status: DISCONTINUED | OUTPATIENT
Start: 2019-01-11 | End: 2019-01-14

## 2019-01-09 RX ORDER — DEXMEDETOMIDINE HYDROCHLORIDE 4 UG/ML
INJECTION, SOLUTION INTRAVENOUS AS NEEDED
Status: DISCONTINUED | OUTPATIENT
Start: 2019-01-09 | End: 2019-01-09 | Stop reason: HOSPADM

## 2019-01-09 RX ORDER — SODIUM CHLORIDE 9 MG/ML
INJECTION, SOLUTION INTRAVENOUS
Status: DISCONTINUED | OUTPATIENT
Start: 2019-01-09 | End: 2019-01-09 | Stop reason: HOSPADM

## 2019-01-09 RX ORDER — SODIUM CHLORIDE 0.9 % (FLUSH) 0.9 %
5-40 SYRINGE (ML) INJECTION AS NEEDED
Status: DISCONTINUED | OUTPATIENT
Start: 2019-01-09 | End: 2019-01-09 | Stop reason: HOSPADM

## 2019-01-09 RX ORDER — MIDAZOLAM HYDROCHLORIDE 1 MG/ML
1 INJECTION, SOLUTION INTRAMUSCULAR; INTRAVENOUS AS NEEDED
Status: DISCONTINUED | OUTPATIENT
Start: 2019-01-09 | End: 2019-01-09 | Stop reason: HOSPADM

## 2019-01-09 RX ORDER — MIDAZOLAM HYDROCHLORIDE 1 MG/ML
INJECTION, SOLUTION INTRAMUSCULAR; INTRAVENOUS AS NEEDED
Status: DISCONTINUED | OUTPATIENT
Start: 2019-01-09 | End: 2019-01-09 | Stop reason: HOSPADM

## 2019-01-09 RX ORDER — ROCURONIUM BROMIDE 10 MG/ML
INJECTION, SOLUTION INTRAVENOUS AS NEEDED
Status: DISCONTINUED | OUTPATIENT
Start: 2019-01-09 | End: 2019-01-09 | Stop reason: HOSPADM

## 2019-01-09 RX ORDER — DEXAMETHASONE SODIUM PHOSPHATE 4 MG/ML
INJECTION, SOLUTION INTRA-ARTICULAR; INTRALESIONAL; INTRAMUSCULAR; INTRAVENOUS; SOFT TISSUE AS NEEDED
Status: DISCONTINUED | OUTPATIENT
Start: 2019-01-09 | End: 2019-01-09 | Stop reason: HOSPADM

## 2019-01-09 RX ORDER — CLINDAMYCIN PHOSPHATE 900 MG/50ML
900 INJECTION INTRAVENOUS
Status: COMPLETED | OUTPATIENT
Start: 2019-01-09 | End: 2019-01-09

## 2019-01-09 RX ORDER — DIAZEPAM 10 MG/2ML
5 INJECTION INTRAMUSCULAR ONCE
Status: COMPLETED | OUTPATIENT
Start: 2019-01-09 | End: 2019-01-09

## 2019-01-09 RX ORDER — ONDANSETRON 2 MG/ML
4 INJECTION INTRAMUSCULAR; INTRAVENOUS
Status: DISPENSED | OUTPATIENT
Start: 2019-01-09 | End: 2019-01-10

## 2019-01-09 RX ORDER — FENTANYL CITRATE 50 UG/ML
25 INJECTION, SOLUTION INTRAMUSCULAR; INTRAVENOUS
Status: DISCONTINUED | OUTPATIENT
Start: 2019-01-09 | End: 2019-01-09 | Stop reason: HOSPADM

## 2019-01-09 RX ORDER — FENTANYL CITRATE 50 UG/ML
25 INJECTION, SOLUTION INTRAMUSCULAR; INTRAVENOUS
Status: COMPLETED | OUTPATIENT
Start: 2019-01-09 | End: 2019-01-09

## 2019-01-09 RX ADMIN — MIDAZOLAM HYDROCHLORIDE 2 MG: 1 INJECTION, SOLUTION INTRAMUSCULAR; INTRAVENOUS at 07:18

## 2019-01-09 RX ADMIN — ALBUMIN HUMAN 250 ML: 50 SOLUTION INTRAVENOUS at 09:07

## 2019-01-09 RX ADMIN — FENTANYL CITRATE 25 MCG: 50 INJECTION INTRAMUSCULAR; INTRAVENOUS at 14:25

## 2019-01-09 RX ADMIN — MIDAZOLAM 0.5 MG: 1 INJECTION INTRAMUSCULAR; INTRAVENOUS at 13:41

## 2019-01-09 RX ADMIN — SODIUM CHLORIDE 5 MG: 9 INJECTION INTRAMUSCULAR; INTRAVENOUS; SUBCUTANEOUS at 17:26

## 2019-01-09 RX ADMIN — Medication 10 ML: at 22:00

## 2019-01-09 RX ADMIN — STANDARDIZED SENNA CONCENTRATE AND DOCUSATE SODIUM 1 TABLET: 8.6; 5 TABLET, FILM COATED ORAL at 17:30

## 2019-01-09 RX ADMIN — SODIUM CHLORIDE, SODIUM LACTATE, POTASSIUM CHLORIDE, AND CALCIUM CHLORIDE: 600; 310; 30; 20 INJECTION, SOLUTION INTRAVENOUS at 07:25

## 2019-01-09 RX ADMIN — ALBUMIN HUMAN 250 ML: 50 SOLUTION INTRAVENOUS at 10:04

## 2019-01-09 RX ADMIN — FENTANYL CITRATE 25 MCG: 50 INJECTION INTRAMUSCULAR; INTRAVENOUS at 16:00

## 2019-01-09 RX ADMIN — KETAMINE HYDROCHLORIDE 30 MG: 10 INJECTION, SOLUTION INTRAMUSCULAR; INTRAVENOUS at 08:07

## 2019-01-09 RX ADMIN — PROPOFOL 50 MG: 10 INJECTION, EMULSION INTRAVENOUS at 08:35

## 2019-01-09 RX ADMIN — PROPOFOL: 10 INJECTION, EMULSION INTRAVENOUS at 08:36

## 2019-01-09 RX ADMIN — SUCCINYLCHOLINE CHLORIDE 140 MG: 20 INJECTION INTRAMUSCULAR; INTRAVENOUS at 07:40

## 2019-01-09 RX ADMIN — PROPOFOL 100 MCG/KG/MIN: 10 INJECTION, EMULSION INTRAVENOUS at 07:40

## 2019-01-09 RX ADMIN — LIDOCAINE HYDROCHLORIDE 100 MG: 20 INJECTION, SOLUTION EPIDURAL; INFILTRATION; INTRACAUDAL; PERINEURAL at 07:40

## 2019-01-09 RX ADMIN — DEXMEDETOMIDINE HYDROCHLORIDE 4 MCG: 4 INJECTION, SOLUTION INTRAVENOUS at 12:17

## 2019-01-09 RX ADMIN — MIDAZOLAM 0.5 MG: 1 INJECTION INTRAMUSCULAR; INTRAVENOUS at 13:10

## 2019-01-09 RX ADMIN — FENTANYL CITRATE 50 MCG: 50 INJECTION, SOLUTION INTRAMUSCULAR; INTRAVENOUS at 07:40

## 2019-01-09 RX ADMIN — MIDAZOLAM 0.5 MG: 1 INJECTION INTRAMUSCULAR; INTRAVENOUS at 14:05

## 2019-01-09 RX ADMIN — ONDANSETRON 4 MG: 2 INJECTION INTRAMUSCULAR; INTRAVENOUS at 23:34

## 2019-01-09 RX ADMIN — CLINDAMYCIN PHOSPHATE 900 MG: 900 INJECTION, SOLUTION INTRAVENOUS at 17:00

## 2019-01-09 RX ADMIN — SODIUM CHLORIDE 125 ML/HR: 900 INJECTION, SOLUTION INTRAVENOUS at 14:00

## 2019-01-09 RX ADMIN — FENTANYL CITRATE 25 MCG: 50 INJECTION INTRAMUSCULAR; INTRAVENOUS at 13:41

## 2019-01-09 RX ADMIN — SODIUM CHLORIDE, SODIUM LACTATE, POTASSIUM CHLORIDE, AND CALCIUM CHLORIDE 25 ML/HR: 600; 310; 30; 20 INJECTION, SOLUTION INTRAVENOUS at 06:44

## 2019-01-09 RX ADMIN — HYDROMORPHONE HYDROCHLORIDE 1 MG: 2 INJECTION, SOLUTION INTRAMUSCULAR; INTRAVENOUS; SUBCUTANEOUS at 08:23

## 2019-01-09 RX ADMIN — ONDANSETRON 4 MG: 2 INJECTION INTRAMUSCULAR; INTRAVENOUS at 16:54

## 2019-01-09 RX ADMIN — SODIUM CHLORIDE 125 ML/HR: 900 INJECTION, SOLUTION INTRAVENOUS at 23:21

## 2019-01-09 RX ADMIN — DEXAMETHASONE SODIUM PHOSPHATE 4 MG: 4 INJECTION, SOLUTION INTRA-ARTICULAR; INTRALESIONAL; INTRAMUSCULAR; INTRAVENOUS; SOFT TISSUE at 07:45

## 2019-01-09 RX ADMIN — Medication 2 G: at 07:50

## 2019-01-09 RX ADMIN — FENTANYL CITRATE 25 MCG: 50 INJECTION INTRAMUSCULAR; INTRAVENOUS at 15:50

## 2019-01-09 RX ADMIN — FENTANYL CITRATE 25 MCG: 50 INJECTION INTRAMUSCULAR; INTRAVENOUS at 16:05

## 2019-01-09 RX ADMIN — Medication 5 MG: at 15:03

## 2019-01-09 RX ADMIN — HYDROMORPHONE HYDROCHLORIDE 0.5 MG: 2 INJECTION, SOLUTION INTRAMUSCULAR; INTRAVENOUS; SUBCUTANEOUS at 12:14

## 2019-01-09 RX ADMIN — FENTANYL CITRATE 25 MCG: 50 INJECTION INTRAMUSCULAR; INTRAVENOUS at 13:14

## 2019-01-09 RX ADMIN — DIAZEPAM 5 MG: 10 INJECTION INTRAMUSCULAR at 15:03

## 2019-01-09 RX ADMIN — Medication: at 13:34

## 2019-01-09 RX ADMIN — PROPOFOL 100 MG: 10 INJECTION, EMULSION INTRAVENOUS at 07:40

## 2019-01-09 RX ADMIN — MIDAZOLAM 0.5 MG: 1 INJECTION INTRAMUSCULAR; INTRAVENOUS at 13:15

## 2019-01-09 RX ADMIN — CLINDAMYCIN IN 5 PERCENT DEXTROSE 900 MG: 18 INJECTION, SOLUTION INTRAVENOUS at 07:57

## 2019-01-09 RX ADMIN — ROCURONIUM BROMIDE 10 MG: 10 INJECTION, SOLUTION INTRAVENOUS at 07:40

## 2019-01-09 RX ADMIN — SODIUM CHLORIDE: 9 INJECTION, SOLUTION INTRAVENOUS at 07:52

## 2019-01-09 RX ADMIN — FENTANYL CITRATE 25 MCG: 50 INJECTION INTRAMUSCULAR; INTRAVENOUS at 14:05

## 2019-01-09 RX ADMIN — MIDAZOLAM HYDROCHLORIDE 2 MG: 1 INJECTION, SOLUTION INTRAMUSCULAR; INTRAVENOUS at 07:29

## 2019-01-09 RX ADMIN — Medication 2 G: at 10:55

## 2019-01-09 RX ADMIN — DEXMEDETOMIDINE HYDROCHLORIDE 4 MCG: 4 INJECTION, SOLUTION INTRAVENOUS at 12:20

## 2019-01-09 RX ADMIN — FENTANYL CITRATE 15 MCG: 50 INJECTION, SOLUTION INTRAMUSCULAR; INTRAVENOUS at 07:50

## 2019-01-09 RX ADMIN — ONDANSETRON 4 MG: 2 INJECTION INTRAMUSCULAR; INTRAVENOUS at 11:57

## 2019-01-09 RX ADMIN — PROPOFOL: 10 INJECTION, EMULSION INTRAVENOUS at 10:48

## 2019-01-09 RX ADMIN — FENTANYL CITRATE 25 MCG: 50 INJECTION INTRAMUSCULAR; INTRAVENOUS at 15:55

## 2019-01-09 RX ADMIN — ALBUMIN HUMAN 250 ML: 50 SOLUTION INTRAVENOUS at 11:19

## 2019-01-09 NOTE — PERIOP NOTES
TRANSFER - OUT REPORT: 
 
Verbal report given to [address] BULMARO Howard(name) on Jami Adhikari  being transferred to 532(unit) for routine post - op Report consisted of patients Situation, Background, Assessment and  
Recommendations(SBAR). Time Pre op antibiotic given:0750 Ancef; 1834 Clinda Anesthesia Stop time: 1250 Oden Present on Transfer to floor:y Order for Oden on Chart:y 
Discharge Prescriptions with Chart:n Information from the following report(s) SBAR, OR Summary, Intake/Output, MAR, Recent Results and Cardiac Rhythm NSR was reviewed with the receiving nurse. Opportunity for questions and clarification was provided. Is the patient on 02? YES 
     L/Min 2 Other Is the patient on a monitor? NO Is the nurse transporting with the patient? NO Surgical Waiting Area notified of patient's transfer from PACU? YES, previously. Family has been waiting in room on [address]. 
 
 
The following personal items collected during your admission accompanied patient upon transfer:  
Dental Appliance:   
Vision:   
Hearing Aid:   
Jewelry:   
Clothing: Clothing: (clothing bag placed on lower frame of pt's bed in pacu) Other Valuables: Other Valuables: (specs in \"Joy\" case placed inside clothing bag in pacu) Valuables sent to safe:

## 2019-01-09 NOTE — ANESTHESIA PREPROCEDURE EVALUATION
Anesthetic History No history of anesthetic complications Review of Systems / Medical History Patient summary reviewed, nursing notes reviewed and pertinent labs reviewed Pulmonary Within defined limits Neuro/Psych Within defined limits Cardiovascular Within defined limits GI/Hepatic/Renal 
Within defined limits Endo/Other Within defined limits Other Findings Physical Exam 
 
Airway Mallampati: II 
TM Distance: > 6 cm Neck ROM: normal range of motion Mouth opening: Normal 
 
 Cardiovascular Regular rate and rhythm,  S1 and S2 normal,  no murmur, click, rub, or gallop Dental 
No notable dental hx Pulmonary Breath sounds clear to auscultation Abdominal 
GI exam deferred Other Findings Anesthetic Plan ASA: 2 Anesthesia type: general 
 
Monitoring Plan: Arterial line Induction: Intravenous Anesthetic plan and risks discussed with: Patient

## 2019-01-09 NOTE — BRIEF OP NOTE
BRIEF OPERATIVE NOTE Date of Procedure: 1/9/2019 Preoperative Diagnosis: SCOLIOSIS Postoperative Diagnosis: SCOLIOSIS Procedure(s): POSTERIOR SPINAL  FUSION T4 TO PELVIS WITH MULTIPLE GABE OSTEOTOMIES Surgeon(s) and Role: Ancelmo Malik MD - Primary Rosanna Quijano MD - Resident, Assistant Surgical Staff: 
Cell Saver : Marcia Jack Circ-1: Sonny Medrano RN 
Circ-Relief: Ange Champion RN Radiology Technician: Luna Sarah, RT, R; Humza Duggan, [place], [place] Scrub Tech-1: Dugn Graves Scrub RN-Relief: Kelsey Kay RN Surg Asst-1: Mark Kim 
Event Time In Time Out Incision Start 7052 Incision Close 1217 Anesthesia: General  
Estimated Blood Loss: 200cc Specimens: * No specimens in log * Findings: Scoliosis Complications: None Implants:  
Implant Name Type Inv. Item Serial No.  Lot No. [id number] No. Used Action GRAFT BNE MORSELS 1-2MM 10ML --  - SN/A Synthetics GRAFT BNE MORSELS 1-2MM 10ML --  N/A NOVABONE [id number] N/A 3 Implanted SCR SET PDCL BLOCKER STD HF --  - SN/A Screw SCR SET PDCL BLOCKER STD HF --  N/A [place] US SPINE N/A N/A 31 Implanted OMKAR SPNE STR [id number] COCR --  - SN/A Omkar OMKAR SPNE STR [id number] COCR --  N/A [employer] US SPINE N/A N/A 2 Implanted SCR TRPL LD REDUCTN 5.5X45MM -- LT LANA - SN/A Screw SCR TRPL LD REDUCTN 5.5X45MM -- LT LANA N/A [employer] US SPINE N/A N/A 2 Implanted SCR TRPLRT PAXIL TAPD 6.5X40MM -- DK LANA - SN/A Screw SCR TRPLRT PAXIL TAPD 6.5X40MM -- DK LANA N/A [employer] US SPINE N/A N/A 2 Implanted SCR TRPLRT PAXIL TAPD 6.5X35MM -- DK LANA - SN/A Screw SCR TRPLRT PAXIL TAPD 6.5X35MM -- DK LANA N/A [employer] US SPINE N/A N/A 9 Implanted SCR TRPL LD REDUCTN 5.5X35MM -- LT LANA - SN/A Screw SCR TRPL LD REDUCTN 5.5X35MM -- LT LANA N/A [employer] US SPINE N/A N/A 13 Implanted SCR TRPL LD REDUCTN 4.5X35MM -- MAG - SN/A Screw SCR TRPL LD REDUCTN 4.5X35MM -- MAG N/A [employer] US SPINE N/A N/A 3 Implanted US SPINE CLOSED HEAD SCREW 7.2X80MM Screw  N/A US SPINE MO N/A N/A 2 Implanted Activity: as tolerated, WBAT Diet: Start clears this evening, then advance as tolerated General diet Pain: PCA overnight, then transition to PO only tomorrow DVT ppx: Lovenox starting POD 1, SCDs Drains: None Remove Oden POD 1 Labs: AM CBC, BMP IVFs: NS until tolerating PO Consults: PT 
Dispo: Pending physical therapy and medical stability

## 2019-01-09 NOTE — PERIOP NOTES
Updated family via volunteer in surgical waiting. Patient still too drowsy to transfer to floor. Sent family to Room 532 to wait. Dr. Keven Akins in to pacu to assess. Spoke with [address], Paddy Medeiros, who will be receiving patient. Gave her update. Patient appears to be attempting to awaken, but still not focusing her eyes, not verbally responding, just attempts to sit up, twist, turn, toss legs off bed. Medication administered. Resting comfortably now with eyes closed. VS remain stable.

## 2019-01-09 NOTE — PROGRESS NOTES
Ortho Recovery room, sore 
 
abd soft, nt 
Up and down toes, ankles, quads Dressing cdi Stable 
 
psf protocol

## 2019-01-09 NOTE — ANESTHESIA PROCEDURE NOTES
Arterial Line Placement Performed by: Abraham Vasquez MD 
Authorized by: Abraham Vasquez MD  
 
Pre-Procedure Preanesthetic Checklist: patient identified, risks and benefits discussed, anesthesia consent, site marked, patient being monitored, timeout performed and patient being monitored Procedure:  
Prep:  Chlorhexidine Seldinger Technique?: Yes Orientation:  Left Location:  Radial artery Catheter size:  20 G Number of attempts:  1 Assessment:  
Post-procedure:  Line secured and sterile dressing applied Patient Tolerance:  Patient tolerated the procedure well with no immediate complications

## 2019-01-09 NOTE — OP NOTES
[address]  OPERATIVE REPORT    [facility]  MR#: [id number]  : 1975  ACCOUNT #: [de-identified]   DATE OF SERVICE: 2019    PREOPERATIVE DIAGNOSIS:  Progressive painful scoliosis. POSTOPERATIVE DIAGNOSIS:   PROCEDURES PERFORMED:  Posterior spinal fusion with segmental instrumentation T4 to pelvis ([employer] instrumentation was utilized). Chente osteotomies, 8 total.      SURGEON:  Annelise Gill MD    ASSISTANT:  Zaira Nixon MD [facility] resident    ANESTHESIA:  General.    POSITION:  Prone. ESTIMATED BLOOD LOSS:  200 mL. SPECIMENS REMOVED:  None. IMPLANTS:  [employer] instrumentation. EXPLANTS:  None. Cell saver was utilized. Spinal cord monitoring was utilized. C-arm was utilized. COMPLICATIONS:  None. This is a 82-year-old lady with the above diagnosis. Risks and benefits were discussed with her and her family on more than one occasion. We specifically discussed bleeding, infection, spinal cord injury, nerve root injury, hardware issues. We discussed that this may not resolve all of her pain. We discussed basically the risks and benefits of undertaking this significant endeavor. She states she understands and wished to proceed. She has given this a lot of thought, she is pretty miserable. She has failed conservative management. The patient was approached supine after obtaining adequate anesthesia, she was given IV antibiotics. She was given antifibrinolytics. After obtaining adequate anesthesia, somatosensory and motor evoked potential leads were applied. They were monitored throughout the case. They were unchanged throughout the case. A Oden was placed using sterile technique. She was then placed prone on a Armando type frame. All appropriate pressure points were well padded. Care was taken to ensure that her belly and breast were free without impingement. All appropriate pressure points were well padded.   She underwent routine prep and drape. Her skin was scrubbed with alcohol prior to this. A generous midline incision was made. Dissection carried out to the tips of the transverse processes from T4-S2. Using a technique of Jorge, facetectomies were performed from T4 to the sacrum. Using the technique of Jose, pedicle screws were placed from the sacrum to T4. Using the teardrop method, and S2 starting site iliac screws were placed with good bony bite. Rods were contoured. Due to the rigidity of the curve Chente osteotomies were then undertaken at T7-8, T8-9, T9-10, T12-L1, L1-L2, L2-L3, L3-L4 and L4-L5. Rods were then contoured, seated on her left followed by the right. Rotation compression and distraction were utilized and we were able to get significant correction. No changes were noted on somatosensory or motor evoked potential leads, no dural leaks were encountered. No screw pullouts occurred. Rods were locked into place. Prior to placing the rods, the exposed bone had been thoroughly decorticated. Bone graft was then placed followed by pattern antibiotics. The wound was closed in layers. Sterile dressing was applied. She tolerated the procedure well. All counts were correct at the end of the case.       MD JUVENCIO Salmon / [place]  D: 01/09/2019 11:54     T: 01/09/2019 15:28  JOB #: [id number]

## 2019-01-09 NOTE — ANESTHESIA POSTPROCEDURE EVALUATION
Procedure(s): POSTERIOR SPINAL  FUSION T4 TO PELVIS WITH MULTIPLE GABE OSTEOTOMIES. Anesthesia Post Evaluation Comments: Post-Anesthesia Evaluation and Assessment I have evaluated the patient and they are ready for PACU discharge. Patient: Kay Cuevas MRN: [id number]  SSN: [id number] YOB: 1975  Age: 41 y.o. Sex: female Cardiovascular Function/Vital Signs /69   Pulse 99   Temp 36.4 °C (97.5 °F)   Resp 14   Ht 5' 5.5\" (1.664 m)   Wt 91.6 kg (202 lb)   SpO2 99%   BMI 33.10 kg/m² Patient is status post General anesthesia for Procedure(s): POSTERIOR SPINAL  FUSION T4 TO PELVIS WITH MULTIPLE GABE OSTEOTOMIES. Nausea/Vomiting: None Postoperative hydration reviewed and adequate. Pain: 
Pain Scale 1: Numeric (0 - 10) (01/09/19 5796) Pain Intensity 1: 0 (01/09/19 1893) Managed Neurological Status:  
Neuro (WDL): Within Defined Limits (01/09/19 0479) At baseline Mental Status, Level of Consciousness: Alert and  oriented to person, place, and time Pulmonary Status:  
O2 Device: Nasal cannula (01/09/19 1247) Adequate oxygenation and airway patent Complications related to anesthesia: None Post-anesthesia assessment completed. No concerns Signed By: Emeka Ramos MD  
 January 9, 2019 Visit Vitals /69 Pulse 99 Temp 36.4 °C (97.5 °F) Resp 14 Ht 5' 5.5\" (1.664 m) Wt 91.6 kg (202 lb) SpO2 99% BMI 33.10 kg/m²

## 2019-01-10 LAB
ANION GAP SERPL CALC-SCNC: 8 MMOL/L (ref 5–15)
BUN SERPL-MCNC: 10 MG/DL (ref 6–20)
BUN/CREAT SERPL: 16 (ref 12–20)
CALCIUM SERPL-MCNC: 7.8 MG/DL (ref 8.5–10.1)
CHLORIDE SERPL-SCNC: 108 MMOL/L (ref 97–108)
CO2 SERPL-SCNC: 24 MMOL/L (ref 21–32)
CREAT SERPL-MCNC: 0.62 MG/DL (ref 0.55–1.02)
GLUCOSE SERPL-MCNC: 128 MG/DL (ref 65–100)
HGB BLD-MCNC: 9.7 G/DL (ref 11.5–16)
POTASSIUM SERPL-SCNC: 4 MMOL/L (ref 3.5–5.1)
SODIUM SERPL-SCNC: 140 MMOL/L (ref 136–145)

## 2019-01-10 PROCEDURE — 85018 HEMOGLOBIN: CPT

## 2019-01-10 PROCEDURE — 97165 OT EVAL LOW COMPLEX 30 MIN: CPT

## 2019-01-10 PROCEDURE — 74011000250 HC RX REV CODE- 250: Performed by: ORTHOPAEDIC SURGERY

## 2019-01-10 PROCEDURE — 74011250637 HC RX REV CODE- 250/637: Performed by: ORTHOPAEDIC SURGERY

## 2019-01-10 PROCEDURE — 97161 PT EVAL LOW COMPLEX 20 MIN: CPT

## 2019-01-10 PROCEDURE — 97530 THERAPEUTIC ACTIVITIES: CPT

## 2019-01-10 PROCEDURE — 80048 BASIC METABOLIC PNL TOTAL CA: CPT

## 2019-01-10 PROCEDURE — 97116 GAIT TRAINING THERAPY: CPT

## 2019-01-10 PROCEDURE — 97535 SELF CARE MNGMENT TRAINING: CPT

## 2019-01-10 PROCEDURE — 74011250636 HC RX REV CODE- 250/636: Performed by: ORTHOPAEDIC SURGERY

## 2019-01-10 PROCEDURE — 65270000029 HC RM PRIVATE

## 2019-01-10 PROCEDURE — 36415 COLL VENOUS BLD VENIPUNCTURE: CPT

## 2019-01-10 PROCEDURE — 74011250636 HC RX REV CODE- 250/636: Performed by: NURSE PRACTITIONER

## 2019-01-10 PROCEDURE — 74011000250 HC RX REV CODE- 250: Performed by: NURSE PRACTITIONER

## 2019-01-10 PROCEDURE — 93005 ELECTROCARDIOGRAM TRACING: CPT

## 2019-01-10 RX ORDER — OXYCODONE HYDROCHLORIDE 5 MG/1
5-10 TABLET ORAL
Status: DISCONTINUED | OUTPATIENT
Start: 2019-01-10 | End: 2019-01-15 | Stop reason: HOSPADM

## 2019-01-10 RX ADMIN — POLYETHYLENE GLYCOL 3350 17 G: 17 POWDER, FOR SOLUTION ORAL at 13:05

## 2019-01-10 RX ADMIN — SODIUM CHLORIDE 1000 ML: 900 INJECTION, SOLUTION INTRAVENOUS at 21:27

## 2019-01-10 RX ADMIN — STANDARDIZED SENNA CONCENTRATE AND DOCUSATE SODIUM 1 TABLET: 8.6; 5 TABLET, FILM COATED ORAL at 13:05

## 2019-01-10 RX ADMIN — ACETAMINOPHEN 650 MG: 325 TABLET ORAL at 19:36

## 2019-01-10 RX ADMIN — OXYCODONE HYDROCHLORIDE 10 MG: 5 TABLET ORAL at 23:14

## 2019-01-10 RX ADMIN — ACETAMINOPHEN 650 MG: 325 TABLET ORAL at 23:14

## 2019-01-10 RX ADMIN — OXYCODONE HYDROCHLORIDE 5 MG: 5 TABLET ORAL at 13:28

## 2019-01-10 RX ADMIN — OXYCODONE HYDROCHLORIDE 5 MG: 5 TABLET ORAL at 13:05

## 2019-01-10 RX ADMIN — OXYCODONE HYDROCHLORIDE 10 MG: 5 TABLET ORAL at 16:38

## 2019-01-10 RX ADMIN — OXYCODONE HYDROCHLORIDE 10 MG: 5 TABLET ORAL at 19:36

## 2019-01-10 RX ADMIN — CLINDAMYCIN PHOSPHATE 900 MG: 900 INJECTION, SOLUTION INTRAVENOUS at 00:51

## 2019-01-10 RX ADMIN — ACETAMINOPHEN 650 MG: 325 TABLET ORAL at 05:56

## 2019-01-10 RX ADMIN — Medication 10 ML: at 05:58

## 2019-01-10 RX ADMIN — STANDARDIZED SENNA CONCENTRATE AND DOCUSATE SODIUM 1 TABLET: 8.6; 5 TABLET, FILM COATED ORAL at 19:36

## 2019-01-10 RX ADMIN — ONDANSETRON 4 MG: 2 INJECTION INTRAMUSCULAR; INTRAVENOUS at 13:32

## 2019-01-10 RX ADMIN — ACETAMINOPHEN 650 MG: 325 TABLET ORAL at 13:04

## 2019-01-10 RX ADMIN — SODIUM CHLORIDE 5 MG: 9 INJECTION INTRAMUSCULAR; INTRAVENOUS; SUBCUTANEOUS at 08:02

## 2019-01-10 NOTE — PROGRESS NOTES
Problem: Mobility Impaired (Adult and Pediatric) Goal: *Acute Goals and Plan of Care (Insert Text) Physical Therapy Goals Initiated 1/10/2019 1. Patient will move from supine to sit and sit to supine  in bed with supervision/set-up within 4 days. 2. Patient will perform sit to stand with supervision/set-up within 4 days. 3. Patient will ambulate with supervision/set-up for 150 feet with the least restrictive device within 4 days. 4. Patient will ascend/descend 4 stairs with B handrail(s) with minimal assistance/contact guard assist within 4 days. 5. Patient will verbalize and demonstrate understanding of spinal precautions (No bending, lifting greater than 5 lbs, or twisting; log-roll technique; frequent repositioning as instructed) within 4 days. physical Therapy EVALUATION (delayed entry) Patient: Liseth Koenig (28 y.o. female) Date: 1/10/2019 Primary Diagnosis: Scoliosis [M41.9] Procedure(s) (LRB): POSTERIOR SPINAL  FUSION T4 TO PELVIS WITH MULTIPLE GABE OSTEOTOMIES (N/A) 1 Day Post-Op Precautions:  Fall, Back ASSESSMENT : 
Based on the objective data described below, the patient presents with generally decreased strength, balance, ROM, increased pain and overall functional mobility well below that of baseline status. Pt reports being woozy at rest though improved from earlier in AM with RN. Pt agreeable to bed mobility, transfers, gait, brief time in chair with return to bed 2* decreasing BP (transient elevation once seated but then pressure dropped again with increased time in chair), near syncope, and altered responsiveness. Pt left with OT in NAD for breakfast; however, OT further assessed pt and she again, presented with episode of decreased responsiveness. PT informed RN of session events and relayed BPs likely low during gait, however, did not drop SBP below 100 when checked.   SpO2 stable and HR increased to low 120's with activity (pt tachy at baseline 100).  PT to f/u this afternoon with orthostatics. Spoke with NP, NO BRACE indicated per MD.  Aldine Ganser pt will do well once medically stable. Pt likely to be appropriate for home with HHPT and spouse's support. Of note: pt reports R hip \"numbness\" and then R hip pain with movement. Pt states she has suffered similar symptoms in the past which took \"weeks\" to recover. Patient will benefit from skilled intervention to address the above impairments. Patients rehabilitation potential is considered to be Good Factors which may influence rehabilitation potential include:  
[]         None noted 
[]         Mental ability/status []         Medical condition 
[]         Home/family situation and support systems 
[]         Safety awareness [x]         Pain tolerance/management 
[]         Other: PLAN : 
Recommendations and Planned Interventions: 
[x]           Bed Mobility Training             []    Neuromuscular Re-Education 
[x]           Transfer Training                   []    Orthotic/Prosthetic Training 
[x]           Gait Training                         []    Modalities [x]           Therapeutic Exercises           []    Edema Management/Control 
[x]           Therapeutic Activities            [x]    Patient and Family Training/Education 
[]           Other (comment): Frequency/Duration: Patient will be followed by physical therapy  twice daily to address goals. Discharge Recommendations: Home Health Further Equipment Recommendations for Discharge: NA  
 
SUBJECTIVE:  
Patient stated My daughter would like to come for the afternoon session, she is thinking about being a PT. .. OBJECTIVE DATA SUMMARY:  
HISTORY:   
Past Medical History:  
Diagnosis Date  Adverse effect of anesthesia WAS VERY AGGRESSIVE WHEN WAKING UP  
 Hemorrhoids  Scoliosis Past Surgical History:  
Procedure Laterality Date  HX DILATION AND CURETTAGE  2005  HX GYN  2008 EXPLORATORY-PERF. UTERUS DUE TO IUD Prior Level of Function/Home Situation: supportive , pt is a  Personal factors and/or comorbidities impacting plan of care: obesity, hx of hip pain 
 
Home Situation Home Environment: Private residence # Steps to Enter: 4 Rails to Enter: Yes Hand Rails : Bilateral 
One/Two Story Residence: Two story # of Interior Steps: 15 Interior Rails: Right Living Alone: No 
Support Systems: Spouse/Significant Other/Partner Patient Expects to be Discharged to[de-identified] Private residence Current DME Used/Available at Home: Raised toilet seat, Shower chair, Adaptive dressing aides Tub or Shower Type: Tub/Shower combination EXAMINATION/PRESENTATION/DECISION MAKING: Critical Behavior: 
Neurologic State: Drowsy Orientation Level: Oriented X4 Cognition: Appropriate for age attention/concentration Safety/Judgement: Insight into deficits Range Of Motion: 
AROM: Generally decreased, functional 
  
  
  
PROM: (NT) Strength:   
Strength: Generally decreased, functional 
  
  
  
  
  
  
Tone & Sensation:  
Tone: Normal 
  
  
  
  
Sensation: Impaired(R hip numbness) Coordination: 
Coordination: Generally decreased, functional 
Vision:  
Corrective Lenses: Glasses Functional Mobility: 
Bed Mobility: 
Rolling: Minimum assistance; Additional time Supine to Sit: Contact guard assistance Sit to Supine: Minimum assistance Scooting: Contact guard assistance; Additional time Transfers: 
Sit to Stand: Minimum assistance Stand to Sit: Minimum assistance Bed to Chair: Minimum assistance;Assist x2; Additional time Balance:  
Sitting: Impaired; With support Sitting - Static: Fair (occasional) Sitting - Dynamic: Fair (occasional) Standing: Impaired; With support Standing - Static: Fair Standing - Dynamic : FairAmbulation/Gait Training:Distance (ft): 12 Feet (ft)(pt walked around bed to chair) Assistive Device: (B HHA) Ambulation - Level of Assistance: Moderate assistance; Additional time;Assist x2(pt with minHHA to moderate assist with near syncope during w) Gait Description (WDL): Exceptions to [facility] Gait Abnormalities: Antalgic;Decreased step clearance;Trunk sway increased; Path deviations Stance: Right decreased Step Length: Right shortened;Left shortened Functional Measure: 
Tinetti test: 
 
Sitting Balance: 0 Arises: 0 Attempts to Rise: 0 Immediate Standing Balance: 1 Standing Balance: 1 Nudged: 0 Eyes Closed: 0 Turn 360 Degrees - Continuous/Discontinuous: 0 Turn 360 Degrees - Steady/Unsteady: 0 Sitting Down: 0 Balance Score: 2 Indication of Gait: 0 
R Step Length/Height: 0 
L Step Length/Height: 0 
R Foot Clearance: 1 L Foot Clearance: 1 Step Symmetry: 0 Step Continuity: 0 Path: 1 Trunk: 0 Walking Time: 0 Gait Score: 3 Total Score: 5 Tinetti Tool Score Risk of Falls 
<19 = High Fall Risk 19-24 = Moderate Fall Risk 25-28 = Low Fall Risk Tinetti ME. Performance-Oriented Assessment of Mobility Problems in Elderly Patients. [employer] 66; [id number]. (Scoring Description: PT Bulletin Feb. 10, 1993) Older adults: Isauro Ramirez et al, 2009; n = [address] elderly evaluated with ABC, STEPH, ADL, and IADL) · Mean STEPH score for males aged 69-68 years = 26.21(3.40) · Mean STEPH score for females age 69-68 years = 25.16(4.30) · Mean STEPH score for males over 80 years = 23.29(6.02) · Mean STEPH score for females over 80 years = 17.20(8.32) Pain: 
Pain Scale 1: Numeric (0 - 10) Pain Intensity 1: 3 Pain Location 1: Incisional;Back Pain Orientation 1: Mid;Lower Pain Description 1: Aching; Sore Pain Intervention(s) 1: Encouraged PCA; Rest 
Activity Tolerance: Pt with drop in BP with supine to OOB (unable to obtain reading in sitting 2* pt discomfort and bracing with LUE); pt suspected to have drop in walking as she became faint and demonstrated decreased alertness; BP stabilized in chair briefly but then dropped again, pt assisted back to bed; once in bed, HOB elevated for breakfast and BP remained in low 100's with decreased responsiveness. HOB lowered per OT and RN informed. Please refer to the flowsheet for vital signs taken during this treatment. After treatment:  
[]         Patient left in no apparent distress sitting up in chair 
[x]         Patient left in no apparent distress in bed 
[x]         Call bell left within reach [x]         Nursing notified 
[x]         OT present 
[]         Bed alarm activated COMMUNICATION/EDUCATION:  
The patients plan of care was discussed with: Registered Nurse, OT. [x]         Fall prevention education was provided and the patient/caregiver indicated understanding. [x]         Patient/family have participated as able in goal setting and plan of care. [x]         Patient/family agree to work toward stated goals and plan of care. []         Patient understands intent and goals of therapy, but is neutral about his/her participation. []         Patient is unable to participate in goal setting and plan of care. Thank you for this referral. 
Anuel Merino Time Calculation: 33 mins

## 2019-01-10 NOTE — PROGRESS NOTES
Problem: Self Care Deficits Care Plan (Adult) Goal: *Acute Goals and Plan of Care (Insert Text) Occupational Therapy Goals Initiated 1/10/2019 1. Patient will perform lower body dressing with modified independence using AE PRN within 4 days. 2.  Patient will perform toileting with modified independence using most appropriate DME within 4 days. 3.  Patient will perform grooming task standing at modified independence within 4 days. 4.  Patient will don/doff back brace at supervision/set-up within 4 days. 5.  Patient will verbalize/demonstrate 3/3 back precautions during ADL tasks without cues within 4 days. Occupational Therapy EVALUATION Patient: Tiesha Fernandez (28 y.o. female) Date: 1/10/2019 Primary Diagnosis: Scoliosis [M41.9] Procedure(s) (LRB): POSTERIOR SPINAL  FUSION T4 TO PELVIS WITH MULTIPLE GABE OSTEOTOMIES (N/A) 1 Day Post-Op Precautions:   Fall, Back ASSESSMENT : 
Based on the objective data described below, the patient presents with largely supervision for UB ADLs and MAX A for LB ADLs s/p spinal sx POD1 w/ newly acquired spinal precautions, decreased standing balance, decreased endurance, pain in low back and R hip, numbness in RLE, and with orthostatic hypotension during session today. PTA, patient was largely IND for ADL and IADL tasks including working and driving. Patient reports that 1 year ago, she had the same sharp, shooting pain in her R hip. Limited evaluation today due to patient with dizziness, nausea, and low BP. Provided educational handouts on home safety w/ ADLs post back surgery and HEP with patient and family verbalizing understanding. Patient unable to tailor sit seated upright in chair and needs education on AE for LB dressing. Anticipate need for HHOT post discharge to maximize patient safety and independence w/ ADL tasks. Patient will benefit from skilled intervention to address the above impairments. Patients rehabilitation potential is considered to be Good Factors which may influence rehabilitation potential include:  
[x]             None noted []             Mental ability/status []             Medical condition []             Home/family situation and support systems []             Safety awareness []             Pain tolerance/management 
[]             Other: PLAN : 
Recommendations and Planned Interventions: 
[x]               Self Care Training                  [x]        Therapeutic Activities [x]               Functional Mobility Training    []        Cognitive Retraining 
[x]               Therapeutic Exercises           [x]        Endurance Activities [x]               Balance Training                   []        Neuromuscular Re-Education []               Visual/Perceptual Training     [x]   Home Safety Training 
[x]               Patient Education                 [x]        Family Training/Education []               Other (comment): Frequency/Duration: Patient will be followed by occupational therapy 5 times a week to address goals. Discharge Recommendations: Home Health Further Equipment Recommendations for Discharge: tub transfer bench, reacher, sockaid (provided education on resources for family to obtain) SUBJECTIVE:  
Patient stated I just feel so tired.  OBJECTIVE DATA SUMMARY:  
HISTORY:  
Past Medical History:  
Diagnosis Date  Adverse effect of anesthesia WAS VERY AGGRESSIVE WHEN WAKING UP  
 Hemorrhoids  Scoliosis Past Surgical History:  
Procedure Laterality Date  HX DILATION AND CURETTAGE  2005  HX GYN  2008 EXPLORATORY-PERF. UTERUS DUE TO IUD Prior Level of Function/Environment/Context: PTA, patient was largely IND for ADL and IADL tasks living with her  and working as a . Expanded or extensive additional review of patient history:  
 
Home Situation Home Environment: Private residence # Steps to Enter: 4 Rails to Enter: Yes Hand Rails : Bilateral 
One/Two Story Residence: Two story # of Interior Steps: 15 Interior Rails: Right Living Alone: No 
Support Systems: Spouse/Significant Other/Partner Patient Expects to be Discharged to[de-identified] Private residence Current DME Used/Available at Home: Raised toilet seat, Shower chair, Adaptive dressing aides Tub or Shower Type: Tub/Shower combination Hand dominance: RightEXAMINATION OF PERFORMANCE DEFICITS: 
Cognitive/Behavioral Status: 
Neurologic State: Drowsy Orientation Level: Oriented X4 Cognition: Appropriate for age attention/concentration Perception: Appears intact Perseveration: No perseveration noted Safety/Judgement: Insight into deficits Skin: back bandage intact; IV in RUE; all other exposed areas intact Edema: BLE Vision/Perceptual:   
    
    
    
  
    
    
Corrective Lenses: Glasses Range of Motion: BUE 
AROM: Generally decreased, functional 
PROM: Generally decreased, functional 
  
 RLE limited at hip at this time-will continue to assess Strength: BUE Strength: Generally decreased, functional(not formally tested 2* spinal precautions) Coordination: 
Coordination: Generally decreased, functional 
Fine Motor Skills-Upper: Left Intact; Right Intact Gross Motor Skills-Upper: Left Intact; Right Intact Tone & Sensation: BUE intact Tone: Normal 
Sensation: Impaired(impaired in RLE; R hip sharp pain and numbness in RLE) Balance: 
Sitting: Impaired; Without support Sitting - Static: Good (unsupported) Sitting - Dynamic: Fair (occasional) Standing: Impaired; Without support Standing - Static: Fair;Constant support Standing - Dynamic : Fair Functional Mobility and Transfers for ADLs:Bed Mobility: 
Rolling: Minimum assistance;Assist x1;Additional time Supine to Sit: Contact guard assistance;Assist x1;Additional time Sit to Supine: Minimum assistance;Assist x1;Additional time Transfers: 
Sit to Stand: Minimum assistance; Additional time;Assist x2 Stand to Sit: Minimum assistance;Assist x1;Additional time;Assist x2 Bed to Chair: Minimum assistance;Assist x2; Additional time Tub Transfer: Minimum assistance; Additional time;Assist x2;Adaptive equipment(simulated) ADL Assessment: 
Feeding: Setup;Supervision(in bed) Oral Facial Hygiene/Grooming: Supervision;Setup(infer in bed 2* pain and decreased mobility) Bathing: Maximum assistance(infer 2* decreased BLE dexterity and balance) Upper Body Dressing: Setup(infer 2* BUE ROM and decreased functional mobility) Lower Body Dressing: Maximum assistance(infer 2* decreased BLE dexterity and spinal precautions) Toileting: Maximum assistance(infer 2* balance and spinal precautions) ADL Intervention and task modifications: 
Feeding Feeding Assistance: Supervision/set-up(in bed) Bathing: Patient instructed and indicated understanding when bathing to not submerge wound in water, stand to shower or sponge bathe, cover wound with plastic and tape to ensure no water reaches bandage/wound without cues. Dressing lower body: Patient instructed to don brace first and on the benefits to remain seated to don all clothing to increase independence with precautions and pain management. Toileting: Patient instructed on the benefits of using flushable wet wipes and toilet tongs if decreased reach or pain for andriy care. Also, the benefits of a reacher to aid in clothing management. Standing: Patient instructed and indicated understanding to walk up to sink/counter top/surfaces, step into walker, square off while using objects, slide objects along surfaces, to increase adherence to back precautions and fall prevention. Patient instructed to increase amount of time standing in order to increase independence and tolerance with ADLs. During prolonged standing, can open cabinet door or place foot on stool to decrease spinal pressure/increase pain. Tub transfer: Patient instructed and indicated understanding regarding when it is safe to begin transfer into tub (complete stairs with PT, advance exercises with PT high enough to clear tub height, and while clothes donned practice with another person present). Patient instructed and indicated understanding to use the same technique as used with stairs when entering and exiting tub (\"up with good leg, down with bad leg\"). Patient instructed and indicated understanding the benefits of maintaining activity tolerance, functional mobility, and independence with self care tasks during acute stay  to ensure safe return home and to baseline. Encouraged patient to increase frequency and duration OOB, not sitting longer than 30 mins without marching/walking with staff, be out of bed for all meals, perform daily ADLs (as approved by RN/MD regarding bathing etc), and performing functional mobility to/from bathroom. Patient instruction and indicated understanding on body mechanics, ergonomics and gravitational force on the spine during different body positions to plan activities in prep for return home to complete instrumental ADLs and back to work safely. Patient instructed and demonstrated while supine hip ER stretch and hold 10 seconds to increase ROM in prep for lower body ADLs daily. Lower Body Dressing Assistance Socks: Total assistance (dependent) Cognitive Retraining Safety/Judgement: Insight into deficits Therapeutic Exercise: 
Patient instructed on the benefits shoulder exercises to increase independence with ADLs, active ROM, and strength of spine. Patient instructed and demonstrated techniques of activating abdomen muscles.  Patient instructed and indicated understanding how to progress reps and sets against gravity to then working up to 5 lbs, until surgeon clears, in which can use household items for weights. Functional Measure: 
Barthel Index: 
 
Bathin Bladder: 0 Bowels: 5 Groomin Dressin Feedin Mobility: 5 Stairs: 0 Toilet Use: 5 Transfer (Bed to Chair and Back): 5 Total: 35 The Barthel ADL Index: Guidelines 1. The index should be used as a record of what a patient does, not as a record of what a patient could do. 2. The main aim is to establish degree of independence from any help, physical or verbal, however minor and for whatever reason. 3. The need for supervision renders the patient not independent. 4. A patient's performance should be established using the best available evidence. Asking the patient, friends/relatives and nurses are the usual sources, but direct observation and common sense are also important. However direct testing is not needed. 5. Usually the patient's performance over the preceding 24-48 hours is important, but occasionally longer periods will be relevant. 6. Middle categories imply that the patient supplies over 50 per cent of the effort. 7. Use of aids to be independent is allowed. Avi Cazares., Barthel, D.W. ([id number]). Functional evaluation: the Barthel Index. [address] [facility] (14)2. ADIEL Orr, Mirna Conway.Zofia.[facility], [address] [place] [address] (). Measuring the change indisability after inpatient rehabilitation; comparison of the responsiveness of the Barthel Index and Functional [place] Measure. Journal of Neurology, Neurosurgery, and Psychiatry, 66(4), 860-265. Helena Garcia, N.J.A, WILLIAM Garcia, & Parish Cortes MEDER. (2004.) Assessment of post-stroke quality of life in cost-effectiveness studies: The usefulness of the Barthel Index and the EuroQoL-5D. [facility], 13, 843-64 Occupational Therapy Evaluation Charge Determination History Examination Decision-Making LOW Complexity : Brief history review  LOW Complexity : 1-3 performance deficits relating to physical, cognitive , or psychosocial skils that result in activity limitations and / or participation restrictions  LOW Complexity : No comorbidities that affect functional and no verbal or physical assistance needed to complete eval tasks Based on the above components, the patient evaluation is determined to be of the following complexity level: LOW Pain: 
Pain Scale 1: Numeric (0 - 10) Pain Intensity 1: 3 Pain Location 1: Incisional;Back Pain Orientation 1: Mid;Lower Pain Description 1: Aching; Sore Pain Intervention(s) 1: Encouraged PCA; Rest 
Activity Tolerance:  
Orthostatic hypotension; -125 bpm during session; O2 sats stable on room air during session Please refer to the flowsheet for vital signs taken during this treatment. After treatment:  
[] Patient left in no apparent distress sitting up in chair 
[x] Patient left in no apparent distress in bed 
[x] Call bell left within reach [x] Nursing notified 
[x] Caregiver present 
[] Bed alarm activated COMMUNICATION/EDUCATION:  
The patients plan of care was discussed with: Physical Therapist and Registered Nurse. [x] Home safety education was provided and the patient/caregiver indicated understanding. [x] Patient/family have participated as able in goal setting and plan of care. [x] Patient/family agree to work toward stated goals and plan of care. [] Patient understands intent and goals of therapy, but is neutral about his/her participation. [] Patient is unable to participate in goal setting and plan of care. This patients plan of care is appropriate for delegation to [facility]. Thank you for this referral. 
Yovana Higgins Time Calculation: 33 mins

## 2019-01-10 NOTE — PROGRESS NOTES
S: Pain and nausea significantly improved since yesterday. Slept well. Not yet tolerating PO. Oden in place. Family at bedside. [place] CP, SOB, Abd pain. O: 
/69 (BP 1 Location: Right arm, BP Patient Position: At rest)   Pulse 98   Temp 98.7 °F (37.1 °C)   Resp 16   Ht 5' 5.5\" (1.664 m)   Wt 91.6 kg (202 lb)   SpO2 98%   BMI 33.10 kg/m² NAD, alert, supine Nonlabored resp on RA Abd benign Dressing c,d,i 
Oden in place, no drains BLE: WWP, intact ehl, fhl, ta, gs, quads, hamstrings. SILT in L4-S1 Recent Results (from the past 12 hour(s)) METABOLIC PANEL, BASIC Collection Time: 01/10/19  2:44 AM  
Result Value Ref Range Sodium 140 136 - 145 mmol/L Potassium 4.0 3.5 - 5.1 mmol/L Chloride 108 97 - 108 mmol/L  
 CO2 24 21 - 32 mmol/L Anion gap 8 5 - 15 mmol/L Glucose 128 (H) 65 - 100 mg/dL BUN 10 6 - 20 MG/DL Creatinine 0.62 0.55 - 1.02 MG/DL  
 BUN/Creatinine ratio 16 12 - 20 GFR est AA >60 >60 ml/min/1.73m2 GFR est non-AA >60 >60 ml/min/1.73m2 Calcium 7.8 (L) 8.5 - 10.1 MG/DL  
HEMOGLOBIN Collection Time: 01/10/19  2:44 AM  
Result Value Ref Range HGB 9.7 (L) 11.5 - 16.0 g/dL A/P: 43yo healthy female POD 1 s/p T4-Pelvis posterior spinal fusion for scoliosis. Activity: as tolerated, WBAT Diet: Clears, then advance as tolerated General diet Pain:transition to PO 
DVT ppx: SCDs, ambulate Drains: None Remove Oden today Labs: AM labs stable Abx: Clindamycin x 3 doses post op IVFs: NS until tolerating PO Consults: PT 
Dispo: Pending physical therapy and medical stability

## 2019-01-10 NOTE — PROGRESS NOTES
Care Management Interventions PCP Verified by CM: Yes(new PCP, Marlene) Palliative Care Criteria Met (RRAT>21 & CHF Dx)?: No 
Transition of Care Consult (CM Consult): Other MyChart Signup: No 
Discharge Durable Medical Equipment: No 
Health Maintenance Reviewed: Yes Physical Therapy Consult: Yes Occupational Therapy Consult: Yes Speech Therapy Consult: No 
Current Support Network: Lives with Spouse Confirm Follow Up Transport: Family Plan discussed with Pt/Family/Caregiver: Yes Freedom of Choice Offered: Yes [employer] Information Provided?: No 
Discharge Location Discharge Placement: Unable to determine at this time Reason for Admission:   Posterior spinal fusion RRAT Score:   2 Plan for utilizing home health: TBD based on progress with therapy Likelihood of Readmission:  low Transition of Care Plan:  Chart reviewed for transitions of care of care, discussed patient during rounds. Patient admitted for the above, will work with therapy once appropriate. Cm met with patients'  at the bedside to explain role and offer support. Patient was independent with ADLs and IADLs prior to admission, no history of home health or rehab. Cm informed them I would assist with any needs at discharge, pending therapy recommendations.  
Carlos Maria BSW, ACM

## 2019-01-10 NOTE — ROUTINE PROCESS
Primary Nurse Anderson Zamorano and Juancarlos Peñaloza, RN performed a dual skin assessment on this patient No impairment noted Merlin score is 23

## 2019-01-10 NOTE — PROGRESS NOTES
Physical Therapy Evaluation completed, full note to follow. Pt presents with dizziness and hypotension with position change/eyes closing and appearance of near syncope with decreased response while walking and later, while sitting up in bed with meal.  Will notify RN and consider PCA use as appropriate. Thank you, Fatmata Ibarra, PT, DPT

## 2019-01-10 NOTE — PROGRESS NOTES
Problem: Mobility Impaired (Adult and Pediatric) Goal: *Acute Goals and Plan of Care (Insert Text) Physical Therapy Goals Initiated 1/10/2019 1. Patient will move from supine to sit and sit to supine  in bed with supervision/set-up within 4 days. 2. Patient will perform sit to stand with supervision/set-up within 4 days. 3. Patient will ambulate with supervision/set-up for 150 feet with the least restrictive device within 4 days. 4. Patient will ascend/descend 4 stairs with B handrail(s) with minimal assistance/contact guard assist within 4 days. 5. Patient will verbalize and demonstrate understanding of spinal precautions (No bending, lifting greater than 5 lbs, or twisting; log-roll technique; frequent repositioning as instructed) within 4 days. physical Therapy TREATMENT Patient: Thao Porras (13 y.o. female) Date: 1/10/2019 Diagnosis: Scoliosis [M41.9] <principal problem not specified> Procedure(s) (LRB): POSTERIOR SPINAL  FUSION T4 TO PELVIS WITH MULTIPLE GABE OSTEOTOMIES (N/A) 1 Day Post-Op Precautions: Fall, Back, NO brace Chart, physical therapy assessment, plan of care and goals were reviewed. ASSESSMENT: 
Pt seen following RN clearance. Pt resting in bed and reports continued dizziness/nausea. Pt agreeable to bed mobility, transfers, brief side steps to HOB/attempted marching in place and OOB to chair. Pt demonstrates good mobility, except for RLE pain and poor clearance; but remains limited by hypotension with standing/walking and dizziness/nausea. Pt with drop in BP to 105/73  with side steps at EOB. Once seated in chair, /82 . Pt tachy at rest 114 and highest  during activity. Pt left in NAD with family and PT tech to continue BP assessment at additional 2' and 4\". Pt likely to be appropriate for return home with HHPT and family support pending continued progress. Progression toward goals: []      Improving appropriately and progressing toward goals [x]      Improving slowly and progressing toward goals 
[]      Not making progress toward goals and plan of care will be adjusted PLAN: 
Patient continues to benefit from skilled intervention to address the above impairments. Continue treatment per established plan of care. Discharge Recommendations:  Home Health Further Equipment Recommendations for Discharge:  NA  
 
SUBJECTIVE:  
Patient stated If its going to make it easier tomorrow, Ill do it. ..  The patient stated 3/3 back precautions. Reviewed all 3 with patient. OBJECTIVE DATA SUMMARY:  
Critical Behavior: 
Neurologic State: Drowsy Orientation Level: Oriented X4 Cognition: Appropriate for age attention/concentration Safety/Judgement: Insight into deficits Functional Mobility Training: 
Bed Mobility: 
Log Rolling: Minimum assistance; Additional time(supine to right) Supine to Sit: Minimum assistance; Additional time Sit to Supine: (NT; OOB to chair) Scooting: Minimum assistance; Additional time Transfers: 
Sit to Stand: Minimum assistance; Additional time(cues for elevated chest/head) Stand to Sit: Minimum assistance; Additional time(cues for alignment/reach back/controlled descent) Bed to Chair: (NT) 
Balance: 
Sitting: Impaired; With support Sitting - Static: Fair (occasional) Sitting - Dynamic: Fair (occasional) Standing: Impaired; With support Standing - Static: Fair Standing - Dynamic : FairAmbulation/Gait Training: 
Distance (ft): 5 Feet (ft)(total (side steps to HOB/chair) marching in place) Assistive Device: Gait belt Ambulation - Level of Assistance: Minimal assistance; Additional time;Assist x2(B HHA) Gait Description (WDL): Exceptions to [facility] Gait Abnormalities: Antalgic;Decreased step clearance; Step to gait(decreased RLE clearance) Stance: Right decreased Step Length: Right shortened;Left shortened Pain: 
 6/10 R hip Activity Tolerance: VS as mentioned above-recommend orthostatics for tomorrow's session as well After treatment:  
[x]  Patient left in no apparent distress sitting up in chair 
[]  Patient left in no apparent distress in bed 
[x]  Call bell left within reach [x]  Nursing notified 
[x]  Caregiver present 
[]  Bed alarm activated COMMUNICATION/COLLABORATION:  
The patients plan of care was discussed with: Registered Nurse Ted Purchase Time Calculation: 24 mins

## 2019-01-11 ENCOUNTER — APPOINTMENT (OUTPATIENT)
Dept: GENERAL RADIOLOGY | Age: 44
DRG: 458 | End: 2019-01-11
Attending: FAMILY MEDICINE
Payer: COMMERCIAL

## 2019-01-11 ENCOUNTER — HOME HEALTH ADMISSION (OUTPATIENT)
Dept: HOME HEALTH SERVICES | Facility: HOME HEALTH | Age: 44
End: 2019-01-11
Payer: COMMERCIAL

## 2019-01-11 PROBLEM — R00.0 SINUS TACHYCARDIA: Status: ACTIVE | Noted: 2019-01-11

## 2019-01-11 LAB
ALBUMIN SERPL-MCNC: 2.9 G/DL (ref 3.5–5)
ALBUMIN/GLOB SERPL: 0.9 {RATIO} (ref 1.1–2.2)
ALP SERPL-CCNC: 58 U/L (ref 45–117)
ALT SERPL-CCNC: 18 U/L (ref 12–78)
ANION GAP SERPL CALC-SCNC: 8 MMOL/L (ref 5–15)
APTT PPP: 30 SEC (ref 22.1–32)
AST SERPL-CCNC: 38 U/L (ref 15–37)
ATRIAL RATE: 121 BPM
BASOPHILS # BLD: 0 K/UL (ref 0–0.1)
BASOPHILS NFR BLD: 0 % (ref 0–1)
BILIRUB SERPL-MCNC: 2 MG/DL (ref 0.2–1)
BUN SERPL-MCNC: 6 MG/DL (ref 6–20)
BUN/CREAT SERPL: 9 (ref 12–20)
CALCIUM SERPL-MCNC: 8 MG/DL (ref 8.5–10.1)
CALCULATED P AXIS, ECG09: 54 DEGREES
CALCULATED R AXIS, ECG10: 45 DEGREES
CALCULATED T AXIS, ECG11: -40 DEGREES
CHLORIDE SERPL-SCNC: 108 MMOL/L (ref 97–108)
CO2 SERPL-SCNC: 26 MMOL/L (ref 21–32)
CREAT SERPL-MCNC: 0.68 MG/DL (ref 0.55–1.02)
DIAGNOSIS, 93000: NORMAL
DIFFERENTIAL METHOD BLD: ABNORMAL
EOSINOPHIL # BLD: 0 K/UL (ref 0–0.4)
EOSINOPHIL NFR BLD: 0 % (ref 0–7)
ERYTHROCYTE [DISTWIDTH] IN BLOOD BY AUTOMATED COUNT: 12.9 % (ref 11.5–14.5)
GLOBULIN SER CALC-MCNC: 3.2 G/DL (ref 2–4)
GLUCOSE SERPL-MCNC: 110 MG/DL (ref 65–100)
HCT VFR BLD AUTO: 29.5 % (ref 35–47)
HGB BLD-MCNC: 9.3 G/DL (ref 11.5–16)
IMM GRANULOCYTES # BLD AUTO: 0 K/UL (ref 0–0.04)
IMM GRANULOCYTES NFR BLD AUTO: 1 % (ref 0–0.5)
INR PPP: 1.1 (ref 0.9–1.1)
LYMPHOCYTES # BLD: 1.4 K/UL (ref 0.8–3.5)
LYMPHOCYTES NFR BLD: 16 % (ref 12–49)
MAGNESIUM SERPL-MCNC: 1.8 MG/DL (ref 1.6–2.4)
MCH RBC QN AUTO: 29 PG (ref 26–34)
MCHC RBC AUTO-ENTMCNC: 31.5 G/DL (ref 30–36.5)
MCV RBC AUTO: 91.9 FL (ref 80–99)
MONOCYTES # BLD: 0.9 K/UL (ref 0–1)
MONOCYTES NFR BLD: 10 % (ref 5–13)
NEUTS SEG # BLD: 6.5 K/UL (ref 1.8–8)
NEUTS SEG NFR BLD: 73 % (ref 32–75)
NRBC # BLD: 0 K/UL (ref 0–0.01)
NRBC BLD-RTO: 0 PER 100 WBC
P-R INTERVAL, ECG05: 150 MS
PLATELET # BLD AUTO: 189 K/UL (ref 150–400)
PMV BLD AUTO: 10.1 FL (ref 8.9–12.9)
POTASSIUM SERPL-SCNC: 3.5 MMOL/L (ref 3.5–5.1)
PROT SERPL-MCNC: 6.1 G/DL (ref 6.4–8.2)
PROTHROMBIN TIME: 11.4 SEC (ref 9–11.1)
Q-T INTERVAL, ECG07: 284 MS
QRS DURATION, ECG06: 84 MS
QTC CALCULATION (BEZET), ECG08: 403 MS
RBC # BLD AUTO: 3.21 M/UL (ref 3.8–5.2)
SODIUM SERPL-SCNC: 142 MMOL/L (ref 136–145)
THERAPEUTIC RANGE,PTTT: NORMAL SECS (ref 58–77)
TROPONIN I SERPL-MCNC: <0.05 NG/ML
VENTRICULAR RATE, ECG03: 121 BPM
WBC # BLD AUTO: 8.8 K/UL (ref 3.6–11)

## 2019-01-11 PROCEDURE — 80053 COMPREHEN METABOLIC PANEL: CPT

## 2019-01-11 PROCEDURE — 74011250637 HC RX REV CODE- 250/637: Performed by: ORTHOPAEDIC SURGERY

## 2019-01-11 PROCEDURE — 84484 ASSAY OF TROPONIN QUANT: CPT

## 2019-01-11 PROCEDURE — 85730 THROMBOPLASTIN TIME PARTIAL: CPT

## 2019-01-11 PROCEDURE — 74011250636 HC RX REV CODE- 250/636: Performed by: ORTHOPAEDIC SURGERY

## 2019-01-11 PROCEDURE — 83735 ASSAY OF MAGNESIUM: CPT

## 2019-01-11 PROCEDURE — 77030018846 HC SOL IRR STRL H20 ICUM -A

## 2019-01-11 PROCEDURE — 97530 THERAPEUTIC ACTIVITIES: CPT

## 2019-01-11 PROCEDURE — 36415 COLL VENOUS BLD VENIPUNCTURE: CPT

## 2019-01-11 PROCEDURE — 85610 PROTHROMBIN TIME: CPT

## 2019-01-11 PROCEDURE — 74018 RADEX ABDOMEN 1 VIEW: CPT

## 2019-01-11 PROCEDURE — 85025 COMPLETE CBC W/AUTO DIFF WBC: CPT

## 2019-01-11 PROCEDURE — 97535 SELF CARE MNGMENT TRAINING: CPT

## 2019-01-11 PROCEDURE — 65270000029 HC RM PRIVATE

## 2019-01-11 PROCEDURE — 97116 GAIT TRAINING THERAPY: CPT

## 2019-01-11 PROCEDURE — 74011250636 HC RX REV CODE- 250/636: Performed by: NURSE PRACTITIONER

## 2019-01-11 RX ORDER — SODIUM CHLORIDE, SODIUM LACTATE, POTASSIUM CHLORIDE, CALCIUM CHLORIDE 600; 310; 30; 20 MG/100ML; MG/100ML; MG/100ML; MG/100ML
50 INJECTION, SOLUTION INTRAVENOUS CONTINUOUS
Status: DISCONTINUED | OUTPATIENT
Start: 2019-01-11 | End: 2019-01-15 | Stop reason: HOSPADM

## 2019-01-11 RX ORDER — ENOXAPARIN SODIUM 100 MG/ML
30 INJECTION SUBCUTANEOUS EVERY 12 HOURS
Status: DISCONTINUED | OUTPATIENT
Start: 2019-01-11 | End: 2019-01-11

## 2019-01-11 RX ORDER — FACIAL-BODY WIPES
10 EACH TOPICAL DAILY PRN
Status: DISCONTINUED | OUTPATIENT
Start: 2019-01-11 | End: 2019-01-11 | Stop reason: SDUPTHER

## 2019-01-11 RX ADMIN — DIAZEPAM 5 MG: 5 TABLET ORAL at 21:13

## 2019-01-11 RX ADMIN — Medication 10 ML: at 21:08

## 2019-01-11 RX ADMIN — OXYCODONE HYDROCHLORIDE 5 MG: 5 TABLET ORAL at 09:07

## 2019-01-11 RX ADMIN — Medication 10 ML: at 05:36

## 2019-01-11 RX ADMIN — OXYCODONE HYDROCHLORIDE 5 MG: 5 TABLET ORAL at 05:33

## 2019-01-11 RX ADMIN — DIAZEPAM 5 MG: 5 TABLET ORAL at 00:38

## 2019-01-11 RX ADMIN — OXYCODONE HYDROCHLORIDE 5 MG: 5 TABLET ORAL at 12:11

## 2019-01-11 RX ADMIN — SODIUM CHLORIDE 5 MG: 9 INJECTION INTRAMUSCULAR; INTRAVENOUS; SUBCUTANEOUS at 08:07

## 2019-01-11 RX ADMIN — ACETAMINOPHEN 650 MG: 325 TABLET ORAL at 18:18

## 2019-01-11 RX ADMIN — ACETAMINOPHEN 650 MG: 325 TABLET ORAL at 12:11

## 2019-01-11 RX ADMIN — OXYCODONE HYDROCHLORIDE 5 MG: 5 TABLET ORAL at 15:15

## 2019-01-11 RX ADMIN — STANDARDIZED SENNA CONCENTRATE AND DOCUSATE SODIUM 1 TABLET: 8.6; 5 TABLET, FILM COATED ORAL at 18:18

## 2019-01-11 RX ADMIN — OXYCODONE HYDROCHLORIDE 5 MG: 5 TABLET ORAL at 21:07

## 2019-01-11 RX ADMIN — SODIUM CHLORIDE, SODIUM LACTATE, POTASSIUM CHLORIDE, AND CALCIUM CHLORIDE 50 ML/HR: 600; 310; 30; 20 INJECTION, SOLUTION INTRAVENOUS at 09:09

## 2019-01-11 RX ADMIN — OXYCODONE HYDROCHLORIDE 5 MG: 5 TABLET ORAL at 18:18

## 2019-01-11 RX ADMIN — OXYCODONE HYDROCHLORIDE 5 MG: 5 TABLET ORAL at 08:48

## 2019-01-11 RX ADMIN — Medication 10 ML: at 17:52

## 2019-01-11 RX ADMIN — STANDARDIZED SENNA CONCENTRATE AND DOCUSATE SODIUM 1 TABLET: 8.6; 5 TABLET, FILM COATED ORAL at 08:48

## 2019-01-11 RX ADMIN — ACETAMINOPHEN 650 MG: 325 TABLET ORAL at 05:33

## 2019-01-11 RX ADMIN — ENOXAPARIN SODIUM 30 MG: 30 INJECTION SUBCUTANEOUS at 08:48

## 2019-01-11 NOTE — PROGRESS NOTES
S: 
Having significant abd pain overnight as well as sinus tachycardia. ECG and labs normal. KUB showing possible illeus without high grade findings. Pain now significantly improved this AM. No flatus. Denies CP, SOB. Pain in back improving. Denies new numbness or tingling. Was able to sit in chair yesterday with PT but not ambulate 
  
O: 
/74   Pulse (!) 117   Temp 98.7 °F (37.1 °C)   Resp 18   Ht 5' 5.5\" (1.664 m)   Wt 91.6 kg (202 lb)   SpO2 97%   BMI 33.10 kg/m² NAD, alert, supine Nonlabored resp on RA Abd with mild discomfort on palpation but no rigidity, distension, rebounding Dressing c,d,i 
Oden in place, no drains BLE: WWP, Motor sensation grossly intact 
  
Recent Results (from the past 12 hour(s)) EKG, 12 LEAD, INITIAL Collection Time: 01/10/19 11:34 PM  
Result Value Ref Range Ventricular Rate 121 BPM  
 Atrial Rate 121 BPM  
 P-R Interval 150 ms QRS Duration 84 ms Q-T Interval 284 ms QTC Calculation (Bezet) 403 ms Calculated P Axis 54 degrees Calculated R Axis 45 degrees Calculated T Axis -40 degrees Diagnosis Sinus tachycardia Nonspecific ST and T wave abnormality No previous ECGs available CBC WITH AUTOMATED DIFF Collection Time: 01/11/19 12:12 AM  
Result Value Ref Range WBC 8.8 3.6 - 11.0 K/uL  
 RBC 3.21 (L) 3.80 - 5.20 M/uL HGB 9.3 (L) 11.5 - 16.0 g/dL HCT 29.5 (L) 35.0 - 47.0 % MCV 91.9 80.0 - 99.0 FL  
 MCH 29.0 26.0 - 34.0 PG  
 MCHC 31.5 30.0 - 36.5 g/dL  
 RDW 12.9 11.5 - 14.5 % PLATELET 620 255 - 432 K/uL MPV 10.1 8.9 - 12.9 FL  
 NRBC 0.0 0  WBC ABSOLUTE NRBC 0.00 0.00 - 0.01 K/uL NEUTROPHILS 73 32 - 75 % LYMPHOCYTES 16 12 - 49 % MONOCYTES 10 5 - 13 % EOSINOPHILS 0 0 - 7 % BASOPHILS 0 0 - 1 % IMMATURE GRANULOCYTES 1 (H) 0.0 - 0.5 % ABS. NEUTROPHILS 6.5 1.8 - 8.0 K/UL  
 ABS. LYMPHOCYTES 1.4 0.8 - 3.5 K/UL  
 ABS. MONOCYTES 0.9 0.0 - 1.0 K/UL ABS. EOSINOPHILS 0.0 0.0 - 0.4 K/UL  
 ABS. BASOPHILS 0.0 0.0 - 0.1 K/UL  
 ABS. IMM. GRANS. 0.0 0.00 - 0.04 K/UL  
 DF AUTOMATED MAGNESIUM Collection Time: 01/11/19 12:12 AM  
Result Value Ref Range Magnesium 1.8 1.6 - 2.4 mg/dL METABOLIC PANEL, COMPREHENSIVE Collection Time: 01/11/19 12:12 AM  
Result Value Ref Range Sodium 142 136 - 145 mmol/L Potassium 3.5 3.5 - 5.1 mmol/L Chloride 108 97 - 108 mmol/L  
 CO2 26 21 - 32 mmol/L Anion gap 8 5 - 15 mmol/L Glucose 110 (H) 65 - 100 mg/dL BUN 6 6 - 20 MG/DL Creatinine 0.68 0.55 - 1.02 MG/DL  
 BUN/Creatinine ratio 9 (L) 12 - 20 GFR est AA >60 >60 ml/min/1.73m2 GFR est non-AA >60 >60 ml/min/1.73m2 Calcium 8.0 (L) 8.5 - 10.1 MG/DL Bilirubin, total 2.0 (H) 0.2 - 1.0 MG/DL  
 ALT (SGPT) 18 12 - 78 U/L  
 AST (SGOT) 38 (H) 15 - 37 U/L Alk. phosphatase 58 45 - 117 U/L Protein, total 6.1 (L) 6.4 - 8.2 g/dL Albumin 2.9 (L) 3.5 - 5.0 g/dL Globulin 3.2 2.0 - 4.0 g/dL A-G Ratio 0.9 (L) 1.1 - 2.2    
TROPONIN I Collection Time: 01/11/19 12:12 AM  
Result Value Ref Range Troponin-I, Qt. <0.05 <0.05 ng/mL PROTHROMBIN TIME + INR Collection Time: 01/11/19 12:12 AM  
Result Value Ref Range INR 1.1 0.9 - 1.1 Prothrombin time 11.4 (H) 9.0 - 11.1 sec PTT Collection Time: 01/11/19 12:12 AM  
Result Value Ref Range aPTT 30.0 22.1 - 32.0 sec  
 aPTT, therapeutic range     58.0 - 77.0 SECS  
 
 
 
A/P: 44yo healthy female POD 2 s/p T4-Pelvis posterior spinal fusion for scoliosis. -IVFs restarted 
-Limit narcotics when possible 
-Return to NPO until improvement of symptoms 
-Docusate, senna, PRN bisacodyl  
-May need GI or Surg consult if symptoms worsen again Activity: as tolerated, WBAT Diet: NPO until improvement in abd 
Pain:limit narcotics DVT ppx: SCDs, ambulate, will start lovenox Drains: None Remove Oden today Labs: AM labs stable Abx: Clindamycin x 3 doses post op completed IVFs: NS until tolerating PO Consults: PT 
Dispo: Pending physical therapy and medical stability

## 2019-01-11 NOTE — PROGRESS NOTES
Bedside, Verbal and Written shift change report given to Opal (oncoming nurse) by Minna Felty (offgoing nurse). Report included the following information SBAR, Kardex, OR Summary, Procedure Summary, Intake/Output, MAR, Accordion, Recent Results and Med Rec Status. 2000 - RN assumed pt care. Pt tachycardic. 36 - RN paged Dr. Jose Gregory regarding pt resting heart rate 126. Denies SOB, chest pain and is asymptomatic. Pt reports pain 7/10 at incision site. Awaiting callback. 2125 - Dr. Jose Gregory called back. Orders received for 1 L bolus and EKG. 2335 - RN repaged Dr. Jose Gregory regarding pt resting HR at 136 after receiving 1 L bolus. Pt complains of lower abdominal pain and cramping 9/10. Orders received for hospitalist consult.  RN called consult in to  Dr. Evan Gordon and per Dr. Evan Gordon pt will be seen by Dr. Calderon Child

## 2019-01-11 NOTE — PROGRESS NOTES
O: Pt seen and examined, She I sstill persitently tachycardic. Per history she was not mobile in hospital or at home due to her spine issues. She denies chest pains. She reports having constipation. She Denies fevers. O/E Visit Vitals /82 (BP 1 Location: Left arm, BP Patient Position: At rest) Pulse (!) 102 Temp 99 °F (37.2 °C) Resp 16 Ht 5' 5.5\" (1.664 m) Wt 91.6 kg (202 lb) SpO2 98% BMI 33.10 kg/m² Physical Exam  
Constitutional: She is oriented to person, place, and time. She appears distressed. HENT:  
Head: Normocephalic and atraumatic. Eyes: EOM are normal. Pupils are equal, round, and reactive to light. Neck: Normal range of motion. Neck supple. Cardiovascular: Tachycardia present. Pulmonary/Chest: Effort normal and breath sounds normal.  
Abdominal: Soft. Bowel sounds are normal. She exhibits distension. There is no rebound and no guarding. Musculoskeletal: Normal range of motion. Neurological: She is alert and oriented to person, place, and time. GCS score is 15. Skin: Skin is warm. Psychiatric: Mood and affect normal.  
 
Recent Results (from the past 24 hour(s)) EKG, 12 LEAD, INITIAL Collection Time: 01/10/19 11:34 PM  
Result Value Ref Range Ventricular Rate 121 BPM  
 Atrial Rate 121 BPM  
 P-R Interval 150 ms QRS Duration 84 ms Q-T Interval 284 ms QTC Calculation (Bezet) 403 ms Calculated P Axis 54 degrees Calculated R Axis 45 degrees Calculated T Axis -40 degrees Diagnosis Sinus tachycardia Nonspecific ST and T wave abnormality No previous ECGs available Confirmed by Shayna Shah MD, Hoda Byrd ([id number]) on 1/11/2019 2:38:22 PM 
  
CBC WITH AUTOMATED DIFF Collection Time: 01/11/19 12:12 AM  
Result Value Ref Range WBC 8.8 3.6 - 11.0 K/uL  
 RBC 3.21 (L) 3.80 - 5.20 M/uL HGB 9.3 (L) 11.5 - 16.0 g/dL HCT 29.5 (L) 35.0 - 47.0 % MCV 91.9 80.0 - 99.0 FL  
 MCH 29.0 26.0 - 34.0 PG  
 MCHC 31.5 30.0 - 36.5 g/dL RDW 12.9 11.5 - 14.5 % PLATELET 645 683 - 380 K/uL MPV 10.1 8.9 - 12.9 FL  
 NRBC 0.0 0  WBC ABSOLUTE NRBC 0.00 0.00 - 0.01 K/uL NEUTROPHILS 73 32 - 75 % LYMPHOCYTES 16 12 - 49 % MONOCYTES 10 5 - 13 % EOSINOPHILS 0 0 - 7 % BASOPHILS 0 0 - 1 % IMMATURE GRANULOCYTES 1 (H) 0.0 - 0.5 % ABS. NEUTROPHILS 6.5 1.8 - 8.0 K/UL  
 ABS. LYMPHOCYTES 1.4 0.8 - 3.5 K/UL  
 ABS. MONOCYTES 0.9 0.0 - 1.0 K/UL  
 ABS. EOSINOPHILS 0.0 0.0 - 0.4 K/UL  
 ABS. BASOPHILS 0.0 0.0 - 0.1 K/UL  
 ABS. IMM. GRANS. 0.0 0.00 - 0.04 K/UL  
 DF AUTOMATED MAGNESIUM Collection Time: 01/11/19 12:12 AM  
Result Value Ref Range Magnesium 1.8 1.6 - 2.4 mg/dL METABOLIC PANEL, COMPREHENSIVE Collection Time: 01/11/19 12:12 AM  
Result Value Ref Range Sodium 142 136 - 145 mmol/L Potassium 3.5 3.5 - 5.1 mmol/L Chloride 108 97 - 108 mmol/L  
 CO2 26 21 - 32 mmol/L Anion gap 8 5 - 15 mmol/L Glucose 110 (H) 65 - 100 mg/dL BUN 6 6 - 20 MG/DL Creatinine 0.68 0.55 - 1.02 MG/DL  
 BUN/Creatinine ratio 9 (L) 12 - 20 GFR est AA >60 >60 ml/min/1.73m2 GFR est non-AA >60 >60 ml/min/1.73m2 Calcium 8.0 (L) 8.5 - 10.1 MG/DL Bilirubin, total 2.0 (H) 0.2 - 1.0 MG/DL  
 ALT (SGPT) 18 12 - 78 U/L  
 AST (SGOT) 38 (H) 15 - 37 U/L Alk. phosphatase 58 45 - 117 U/L Protein, total 6.1 (L) 6.4 - 8.2 g/dL Albumin 2.9 (L) 3.5 - 5.0 g/dL Globulin 3.2 2.0 - 4.0 g/dL A-G Ratio 0.9 (L) 1.1 - 2.2    
TROPONIN I Collection Time: 01/11/19 12:12 AM  
Result Value Ref Range Troponin-I, Qt. <0.05 <0.05 ng/mL PROTHROMBIN TIME + INR Collection Time: 01/11/19 12:12 AM  
Result Value Ref Range INR 1.1 0.9 - 1.1 Prothrombin time 11.4 (H) 9.0 - 11.1 sec PTT Collection Time: 01/11/19 12:12 AM  
Result Value Ref Range aPTT 30.0 22.1 - 32.0 sec  
 aPTT, therapeutic range     58.0 - 77.0 SECS Xr Spine Sngl V (cross Table Lat) Result Date: 1/9/2019 IMPRESSION: Spine surgery in progress. INTERPRETATION PROVIDED FOR COMPLIANCE ONLY AT NO CHARGE Xr Abd (kub) Result Date: 1/11/2019 IMPRESSION: Large amount colonic stool and mild small bowel gaseous distention may represent ileus. Xr Fluoroscopy Under 60 Minutes Result Date: 1/9/2019 IMPRESSION:  Fluoroscopy time was provided. Fluoro dose:  153.07 mGy vk Assessment Tachycardia -? Physiologic vs pathologic Cardiac Enzymes negative Since she has been immobile I have asked about performing an CTA chest to r/o PE, Also offered giving pain control before the scan, she is refusing it and aware of delay in diagnosis and treatment of conditions like PE which can lead to death. I have offered performing a duplex of the lower extremities and she refuses this also. For now Check TSH Free t4 Check Echo - Rule  Out valvular disease CTA chest or Duplex/VQ scan if she is agreeable. Abdominal Pain - likely from Constipation , patient reports she is getting a suppository, KUb showing mild ileus vs Stool Anemia - mild Expected from surgical loss, monitor For Now at least recommend DVT ppx per Primary if oK D/W Dr Jeff Burgess nurse. Thank you.

## 2019-01-11 NOTE — CONSULTS
Medical Consultation Report    Patient:  Tayla Wood  MRN:  [id number]  YOB: 1975  Age:  41 y.o. Primary care provider:  Courtney Barber MD    Date of admission:  1/9/2019    Date of consultation:  1/11/2019    Requesting physician:  Dr. Jessie Schuster     Reason for consultation:  tachycardia                               History of present illness  Tayla Wood is a 41 y.o. female with past medical history of hemorrhoids and scoliosis presented for admission on 1/9/2019 with diagnosis of painful scoliosis. Patient underwent posterior spinal fusion with segmental instrumentation T4 to pelvis ([facility] instrumentation was utilized). Chente osteotomies, 8 total.  Tonight, patient is seen per request of on call attending service for tachycardia. Per chart review, patient was tachycardic most of yesterday with heart rate increased to the 130s. Patient complains of generalized abdominal pain, mostly radiating throughout bilateral lower quadrant, constant, severe, rated 7-8/10, characterized as like menstrual cramps, without specific aggravating/ alleviating factors. Patient admits to some left upper chest wall pain which is reproducible when she pushes on her chest, non-radiating, mild, intermittent. Patient notes that she has chronic neuropathy/ numbness radiating from her right buttock/ hip to right knee. Otherwise other than back/ postoperative pain, she has no other complaints. There were no reports of new onset syncope, loss of consciousness, headache, neck pain, visual disturbance, numbness, paresthesias, focal weakness, shortness of breath, cough, palpitations, nausea, vomiting, diarrhea, melena, dysuria, hematuria, calf pain, increased leg swelling/ edema, fever, chills. Impression/Recomendations  1. Sinus tachycardia. Physiologic. No arrhythmia noted. Continue vital sign check. Remote telemetry monitoring. 2. Generalized abdominal pain. May be associated with ongoing menstrual period with cramps but will order KUB and consider for CT abdomen/ pelvis for further evaluation. Continue pain management. 3. Anemia. Not much change from prior hemoglobin. Repeat CBC in a.m.  4. Hyperbilirubinemia. Will order add on test for direct bilirubin and order abdominal ultrasound. DVT prophylaxis. SCDs to BLEs     Thank you very much for allowing us to participate in the care of this pleasant patient. The hospitalist service will continue to follow the patient's medical progress along with you. Vandana Murillo MD   Hospitalist    Past Medical History:   Diagnosis Date    Adverse effect of anesthesia     WAS VERY AGGRESSIVE WHEN WAKING UP    Hemorrhoids     Scoliosis       Past Surgical History:   Procedure Laterality Date    HX DILATION AND CURETTAGE  2005    HX GYN  2008    EXPLORATORY-PERF. UTERUS DUE TO IUD        Prior to Admission medications    Medication Sig Start Date End Date Taking? Authorizing Provider   ibuprofen (MOTRIN) 200 mg tablet Take 400 mg by mouth as needed for Pain.    Yes Provider, Historical     Current Facility-Administered Medications   Medication Dose Route Frequency    oxyCODONE IR (ROXICODONE) tablet 5-10 mg  5-10 mg Oral Q3H PRN    sodium chloride (NS) flush 5-40 mL  5-40 mL IntraVENous Q8H    sodium chloride (NS) flush 5-40 mL  5-40 mL IntraVENous PRN    naloxone (NARCAN) injection 0.4 mg  0.4 mg IntraVENous PRN    senna-docusate (PERICOLACE) 8.6-50 mg per tablet 1 Tab  1 Tab Oral BID    polyethylene glycol (MIRALAX) packet 17 g  17 g Oral DAILY    bisacodyl (DULCOLAX) suppository 10 mg  10 mg Rectal DAILY PRN    acetaminophen (TYLENOL) tablet 650 mg  650 mg Oral Q6H    diazePAM (VALIUM) tablet 5 mg  5 mg Oral Q6H PRN    prochlorperazine (COMPAZINE) with saline injection 5 mg  5 mg IntraVENous Q4H PRN    scopolamine (TRANSDERM-SCOP) 1 mg over 3 days 1 Patch  1 Patch TransDERmal Q72H     No Known Allergies   Family History   Problem Relation Age of Onset    Thyroid Disease Mother     High Cholesterol Father     No Known Problems Sister     Seizures Brother     Cancer Maternal Grandmother         BREAST    Heart Disease Maternal Grandfather     High Cholesterol Maternal Grandfather     Hypertension Paternal Grandmother     Heart Disease Paternal Grandfather         Social history  Living situation  x  Independent               Ambulates  x  Independently                 Alcohol history  x  None             Social History     Tobacco Use   Smoking Status Never Smoker   Smokeless Tobacco Never Used     Illegal drugs:  None reported    Review of systems  Pertinent positives as noted in HPI. All other systems were reviewed and were negative    Physical Examination   Visit Vitals  /75   Pulse (!) 120   Temp 98.8 °F (37.1 °C)   Resp 18   Ht 5' 5.5\" (1.664 m)   Wt 91.6 kg (202 lb)   SpO2 100%   BMI 33.10 kg/m²       O2 Flow Rate (L/min): 2 l/min   O2 Device: Nasal cannula    General:  Obese female in no acute respiratory distress   Head:  Atraumatic   Eyes:  Conjunctivae/corneas clear. PERRLA EOMI   E/N/M/T: Clear oropharynx. Tongue midline/ non-edematous. Oral mucosa dry. Neck: No JVD. No carotid bruits. Trachea midline. Lungs:   Symmetrical chest expansion and respiratory effort  Clear to auscultation bilaterally   Chest wall:  No tenderness or deformity   Heart:  Regular rate and rhythm   Sounds normal; no murmur, click, rub or gallop   Abdomen:   Soft, no tenderness  Bowel sounds normal  No masses or hepatosplenomegaly  No hernias present   Back: No CVA tenderness   Extremities: Extremities normal, atraumatic  No edema     Vascular/  Pulses 2+ radial/ DP bilateral pulses. Skin: No rashes or ulcers  Warm/ dry   Musculo-      skeletal: Gait not tested  No calf tenderness   Neuro: GCS 15. Moves all extremities x 4. No slurred speech. No facial droop. Sensation grossly intact. Psych: Alert, oriented x 3     Geniturinary:  Oden catheter in place     Data Review    12 EKG (I reviewed):  Sinus tachycardia with non-specific ST and T wave changes to 121 bpm    I  Recent Labs     01/11/19  0012 01/10/19  0244 01/09/19  1304   WBC 8.8  --   --    HGB 9.3* 9.7* 10.9*   HCT 29.5*  --  34.1*     --   --      Recent Labs     01/11/19  0012 01/10/19  0244    140   K 3.5 4.0    108   CO2 26 24   * 128*   BUN 6 10   CREA 0.68 0.62   CA 8.0* 7.8*   MG 1.8  --    ALB 2.9*  --    TBILI 2.0*  --    SGOT 38*  --    ALT 18  --

## 2019-01-11 NOTE — PROGRESS NOTES
Cm continuing to follow for transitions of care, discussed patient during rounds. Therapy is recommending home health at this time. Cm met with patient and spouse at the bedside to discuss. They are in agreement with home health. Discharge date is unknown at this time as patient is not medically stable. Referral sent to [facility]; waiting for response. 11:40am: [facility] can accept for services, [facility] 1/14/19.  Follow up information placed on AVS. 
S. Bridget MARTINEZ, ACM

## 2019-01-11 NOTE — PROGRESS NOTES
Patient deferred second visit today as she had walked to the bathroom x 2 and had just gotten comfortable. Will continue to follow this weekend Ivan [place]

## 2019-01-11 NOTE — PROGRESS NOTES
I now have radiology report back for patient's KUB which showed: FINDINGS: 
  
AP radiograph of the abdomen demonstrates a large amount of colonic stool. Mild 
gaseous small bowel dilatation. No abnormal calcifications are identified. Extensive fusion hardware throughout the spine. 
  
IMPRESSION: 
Large amount colonic stool and mild small bowel gaseous distention may represent 
ileus. PLAN:   
-  NPO 
-  Restart IV fluids 0.9% NS IV @ 125 ml/hr 
-  Recommend GI or surgery consultation in the a.m.

## 2019-01-11 NOTE — PROGRESS NOTES
Problem: Self Care Deficits Care Plan (Adult) Goal: *Acute Goals and Plan of Care (Insert Text) Occupational Therapy Goals Initiated 1/10/2019 1. Patient will perform lower body dressing with modified independence using AE PRN within 4 days. 2.  Patient will perform toileting with modified independence using most appropriate DME within 4 days. 3.  Patient will perform grooming task standing at modified independence within 4 days. 4.  Patient will don/doff back brace at supervision/set-up within 4 days. 5.  Patient will verbalize/demonstrate 3/3 back precautions during ADL tasks without cues within 4 days. Occupational Therapy TREATMENT Patient: Tiesha Fernandez (23 y.o. female) Date: 1/11/2019 Diagnosis: Scoliosis [M41.9] Sinus tachycardia Procedure(s) (LRB): POSTERIOR SPINAL  FUSION T4 TO PELVIS WITH MULTIPLE GABE OSTEOTOMIES (N/A) 2 Days Post-Op Precautions: Fall, Back No bending, no lifting greater than 5 lbs, no twisting, log-roll technique, repositioning every 20-30 min except when sleeping Chart, occupational therapy assessment, plan of care, and goals were reviewed. ASSESSMENT: 
Patient received supine in bed agreeable to participate in OT intervention. Patient was SBA for log roll OOB and was CGA for bed > chair transfer. OT educated patient on AE for LB dressing with patient demonstrating competence. Patient also inquiring about tub transfer bench with OT providing handout of resources. Patient continues to be limited by pain in RLE and abd pain. Continue to recommend [address] following hospital stay. Recommend practice with toilet transfers next session. Progression toward goals: 
[x]          Improving appropriately and progressing toward goals 
[]          Improving slowly and progressing toward goals 
[]          Not making progress toward goals and plan of care will be adjusted PLAN: 
Patient continues to benefit from skilled intervention to address the above impairments. Continue treatment per established plan of care. Discharge Recommendations:  Home Health Further Equipment Recommendations for Discharge:  TBD SUBJECTIVE:  
Patient stated This right hip just keeps bothering me.  The patient stated 3/3 back precautions. Reviewed all 3 with patient. OBJECTIVE DATA SUMMARY:  
Cognitive/Behavioral Status: 
Neurologic State: Alert Orientation Level: Oriented X4 Cognition: Appropriate decision making, Appropriate for age attention/concentration, Appropriate safety awareness Safety/Judgement: Decreased awareness of need for safetyFunctional Mobility and Transfers for ADLs:Bed Mobility: 
Rolling: Contact guard assistance Supine to Sit: Stand-by assistance Transfers: 
Sit to Stand: Minimum assistance Bed to Chair: Contact guard assistance;Assist x1;Additional time Balance: 
Sitting: Intact; Without support Sitting - Static: Fair (occasional); Unsupported Sitting - Dynamic: Fair (occasional) Standing: Impaired; Without support Standing - Static: Fair Standing - Dynamic : Fair ADL Intervention and Instruction: Lower Body Dressing Assistance Socks: Supervision/set-up Leg Crossed Method Used: No 
Position Performed: Seated in chair Cues: Doff;Don;Verbal cues provided Adaptive Equipment Used: Reacher;Sock aid Cognitive Retraining Safety/Judgement: Decreased awareness of need for safety Bathing: Patient instructed and indicated understanding when bathing to not submerge wound in water, stand to shower or sponge bathe, cover wound with plastic and tape to ensure no water reaches bandage/wound without cues. Patient educated on tub transfer bench. Dressing lower body: Patient instructed to don brace first and on the benefits to remain seated to don all clothing to increase independence with precautions and pain management. Patient educated on reacher and sockaid. Tub transfer: Patient instructed and indicated understanding regarding when it is safe to begin transfer into tub (complete stairs with PT, advance exercises with PT high enough to clear tub height, and while clothes donned practice with another person present). Patient instructed and indicated understanding to use the same technique as used with stairs when entering and exiting tub (\"up with good leg, down with bad leg\"). Patient instructed and indicated understanding the benefits of maintaining activity tolerance, functional mobility, and independence with self care tasks during acute stay  to ensure safe return home and to baseline. Encouraged patient to increase frequency and duration OOB, not sitting longer than 30 mins without marching/walking with staff, be out of bed for all meals, perform daily ADLs (as approved by RN/MD regarding bathing etc), and performing functional mobility to/from bathroom. Patient instruction and indicated understanding on body mechanics, ergonomics and gravitational force on the spine during different body positions to plan activities in prep for return home to complete instrumental ADLs and back to work safely. Therapeutic Exercises:  
Patient instructed on the benefits shoulder exercises to increase independence with ADLs, active ROM, and strength of spine. Patient instructed and demonstrated techniques of activating abdomen muscles. Patient instructed and indicated understanding how to progress reps and sets against gravity to then working up to 5 lbs, until surgeon clears, in which can use household items for weights. Pain: 
Pain Scale 1: Numeric (0 - 10) Pain Intensity 1: 7 Pain Location 1: Back Pain Orientation 1: Mid 
Pain Description 1: Aching Pain Intervention(s) 1: Medication (see MAR) Activity Tolerance: VSS Please refer to the flowsheet for vital signs taken during this treatment. After treatment: [x] Patient left in no apparent distress sitting up in chair 
[] Patient left in no apparent distress in bed 
[x] Call bell left within reach [x] Nursing notified 
[x] Caregiver present 
[] Bed alarm activated COMMUNICATION/COLLABORATION:  
The patients plan of care was discussed with: Physical Therapist and Registered Nurse Alvin Henson Time Calculation: 23 mins

## 2019-01-11 NOTE — PROGRESS NOTES
Problem: Mobility Impaired (Adult and Pediatric) Goal: *Acute Goals and Plan of Care (Insert Text) Physical Therapy Goals Initiated 1/10/2019 1. Patient will move from supine to sit and sit to supine  in bed with supervision/set-up within 4 days. 2. Patient will perform sit to stand with supervision/set-up within 4 days. 3. Patient will ambulate with supervision/set-up for 150 feet with the least restrictive device within 4 days. 4. Patient will ascend/descend 4 stairs with B handrail(s) with minimal assistance/contact guard assist within 4 days. 5. Patient will verbalize and demonstrate understanding of spinal precautions (No bending, lifting greater than 5 lbs, or twisting; log-roll technique; frequent repositioning as instructed) within 4 days. physical Therapy TREATMENT Patient: Jami Adhikari (64 y.o. female) Date: 1/11/2019 Diagnosis: Scoliosis [M41.9] Sinus tachycardia Procedure(s) (LRB): POSTERIOR SPINAL  FUSION T4 TO PELVIS WITH MULTIPLE GABE OSTEOTOMIES (N/A) 2 Days Post-Op Precautions: Fall, Back, No bending, no lifting greater than 5 lbs, no twisting, log-roll technique, repositioning every 20-30 min except when sleeping, brace when OOB Chart, physical therapy assessment, plan of care and goals were reviewed. ASSESSMENT: 
Patient has been very nauseous and is on a liquid diet. Was having nerve pain in RLE yesterday, from sidelying on R hip (chronic issue), but pain has decreased significantly today. Most of pain is in back at this time. Is eager to try to get up and have Oden catheter removed. Able to roll to left side and sit on EOB, sit-stand, walk 20 ft w.RW and Barbara, then sat up in chair. Mobility improved from yesterday and patient was encouraged. Progression toward goals: 
[]      Improving appropriately and progressing toward goals [x]      Improving slowly and progressing toward goals 
[]      Not making progress toward goals and plan of care will be adjusted PLAN: 
 Patient continues to benefit from skilled intervention to address the above impairments. Continue treatment per established plan of care. Discharge Recommendations: To Be Determined Further Equipment Recommendations for Discharge: To Be Determined. SUBJECTIVE:  
Patient stated I have been so nauseous.  The patient stated 3/3 back precautions. Reviewed all 3 with patient. OBJECTIVE DATA SUMMARY:  
Critical Behavior: 
Neurologic State: Alert Orientation Level: Oriented X4 Cognition: Appropriate for age attention/concentration Safety/Judgement: Insight into deficits Functional Mobility Training: 
Bed Mobility: 
Log Rolling: Contact guard assistance Supine to Sit: Minimum assistance Brace: N/A. No Brace. Transfers: 
Sit to Stand: Minimum assistance Stand to Sit: Minimum assistance Balance: 
Sitting - Static: Fair (occasional); Unsupported Sitting - Dynamic: Fair (occasional) Standing: Impaired Standing - Static: Constant support; Fair 
Standing - Dynamic : FairAmbulation/Gait Training: 
Distance (ft): 20 Feet (ft) Assistive Device: Walker, rolling Ambulation - Level of Assistance: Minimal assistance 
  
: 
  
  
  
Pain: 
Pain Scale 1: Numeric (0 - 10) Pain Intensity 1: 7 Pain Location 1: Back Pain Orientation 1: Mid 
Pain Description 1: Aching Pain Intervention(s) 1: Medication (see MAR) Activity Tolerance:  
Patient Vitals for the past 4 hrs: 
 Pulse BP SpO2  
01/11/19 1100 96 122/72 97 % 01/11/19 1045 (!) 106 130/86 98 % Please refer to the flowsheet for vital signs taken during this treatment. After treatment:  
[x]  Patient left in no apparent distress sitting up in chair 
[]  Patient left in no apparent distress in bed 
[x]  Call bell left within reach [x]  Nursing notified 
[x]  Caregiver present 
[]  Bed alarm activated COMMUNICATION/COLLABORATION:  
The patients plan of care was discussed with: Registered Nurse Sienna Nairs Time Calculation: 30 mins

## 2019-01-12 PROCEDURE — 74011000250 HC RX REV CODE- 250: Performed by: NURSE PRACTITIONER

## 2019-01-12 PROCEDURE — 97116 GAIT TRAINING THERAPY: CPT

## 2019-01-12 PROCEDURE — 65270000029 HC RM PRIVATE

## 2019-01-12 PROCEDURE — 74011250637 HC RX REV CODE- 250/637: Performed by: ORTHOPAEDIC SURGERY

## 2019-01-12 PROCEDURE — 97535 SELF CARE MNGMENT TRAINING: CPT

## 2019-01-12 PROCEDURE — 74011250636 HC RX REV CODE- 250/636: Performed by: ORTHOPAEDIC SURGERY

## 2019-01-12 PROCEDURE — 74011000250 HC RX REV CODE- 250: Performed by: ORTHOPAEDIC SURGERY

## 2019-01-12 PROCEDURE — 74011250636 HC RX REV CODE- 250/636: Performed by: NURSE PRACTITIONER

## 2019-01-12 RX ADMIN — POLYETHYLENE GLYCOL 3350 17 G: 17 POWDER, FOR SOLUTION ORAL at 09:00

## 2019-01-12 RX ADMIN — ACETAMINOPHEN 650 MG: 325 TABLET ORAL at 19:21

## 2019-01-12 RX ADMIN — OXYCODONE HYDROCHLORIDE 5 MG: 5 TABLET ORAL at 13:04

## 2019-01-12 RX ADMIN — DIAZEPAM 5 MG: 5 TABLET ORAL at 22:57

## 2019-01-12 RX ADMIN — OXYCODONE HYDROCHLORIDE 5 MG: 5 TABLET ORAL at 22:54

## 2019-01-12 RX ADMIN — OXYCODONE HYDROCHLORIDE 5 MG: 5 TABLET ORAL at 16:05

## 2019-01-12 RX ADMIN — ACETAMINOPHEN 650 MG: 325 TABLET ORAL at 05:44

## 2019-01-12 RX ADMIN — OXYCODONE HYDROCHLORIDE 5 MG: 5 TABLET ORAL at 09:04

## 2019-01-12 RX ADMIN — STANDARDIZED SENNA CONCENTRATE AND DOCUSATE SODIUM 1 TABLET: 8.6; 5 TABLET, FILM COATED ORAL at 09:04

## 2019-01-12 RX ADMIN — ACETAMINOPHEN 650 MG: 325 TABLET ORAL at 13:04

## 2019-01-12 RX ADMIN — OXYCODONE HYDROCHLORIDE 5 MG: 5 TABLET ORAL at 02:35

## 2019-01-12 RX ADMIN — STANDARDIZED SENNA CONCENTRATE AND DOCUSATE SODIUM 1 TABLET: 8.6; 5 TABLET, FILM COATED ORAL at 19:20

## 2019-01-12 RX ADMIN — SODIUM CHLORIDE, SODIUM LACTATE, POTASSIUM CHLORIDE, AND CALCIUM CHLORIDE 50 ML/HR: 600; 310; 30; 20 INJECTION, SOLUTION INTRAVENOUS at 05:46

## 2019-01-12 RX ADMIN — OXYCODONE HYDROCHLORIDE 5 MG: 5 TABLET ORAL at 05:44

## 2019-01-12 RX ADMIN — OXYCODONE HYDROCHLORIDE 5 MG: 5 TABLET ORAL at 00:13

## 2019-01-12 RX ADMIN — OXYCODONE HYDROCHLORIDE 5 MG: 5 TABLET ORAL at 19:21

## 2019-01-12 RX ADMIN — SODIUM CHLORIDE 5 MG: 9 INJECTION INTRAMUSCULAR; INTRAVENOUS; SUBCUTANEOUS at 09:04

## 2019-01-12 RX ADMIN — ACETAMINOPHEN 650 MG: 325 TABLET ORAL at 00:00

## 2019-01-12 NOTE — PROGRESS NOTES
Problem: Self Care Deficits Care Plan (Adult) Goal: *Acute Goals and Plan of Care (Insert Text) Occupational Therapy Goals Initiated 1/10/2019 1. Patient will perform lower body dressing with modified independence using AE PRN within 4 days. 2.  Patient will perform toileting with modified independence using most appropriate DME within 4 days. 3.  Patient will perform grooming task standing at modified independence within 4 days. 4.  Patient will don/doff back brace at supervision/set-up within 4 days. 5.  Patient will verbalize/demonstrate 3/3 back precautions during ADL tasks without cues within 4 days. Occupational Therapy TREATMENT Patient: Jaquelin Delgadillo (28 y.o. female) Date: 1/12/2019 Diagnosis: Scoliosis [M41.9] Sinus tachycardia Procedure(s) (LRB): POSTERIOR SPINAL  FUSION T4 TO PELVIS WITH MULTIPLE GABE OSTEOTOMIES (N/A) 3 Days Post-Op Precautions: Fall, Back Chart, occupational therapy assessment, plan of care, and goals were reviewed. ASSESSMENT: 
Patient received in bed, recently complete PT and declining further out of bed activity due to 8/10 back pain and fatigue. Agreeable to back education and bed mobility; initially twisting with bed mobility but after training with improved log rolling technique and less twisting/reaching for bed rail. Ice applied and patient instructed to remove it in 20 minutes; Family present for training. Expect she may need HH depending on progress with ADLs in acute care. Progression toward goals: 
[]       Improving appropriately and progressing toward goals [x]       Improving slowly and progressing toward goals 
[]       Not making progress toward goals and plan of care will be adjusted PLAN: 
Patient continues to benefit from skilled intervention to address the above impairments. Continue treatment per established plan of care. Discharge Recommendations:  Home Health, To Be Determined and None Further Equipment Recommendations for Discharge:  [facility] over commode recommended SUBJECTIVE:  
Patient stated Brinana Mckeon would be awesome.  encouraged her to ask for ice OBJECTIVE DATA SUMMARY:  
Cognitive/Behavioral Status: 
Neurologic State: Alert;Drowsy Orientation Level: Oriented X4 Safety/Judgement: Insight into deficits(aware of precautions) Functional Mobility and Transfers for ADLs:Bed Mobility: 
Rolling: Minimum assistance(trasined in bed mobility tech with decreased twisting noted) Supine to Sit: Stand-by assistance(L side to sit) Sit to Supine: Minimum assistance(sit to L side, assist for LEs) Transfers: 
Sit to Stand: Contact guard assistance Balance: 
Sitting: Intact Sitting - Static: Good (unsupported) Sitting - Dynamic: Fair (occasional) Standing: Impaired Standing - Static: Good Standing - Dynamic : Fair ADL Intervention: 
  
 
 see above; pain management with use of ice and consistent precautions recommended Cognitive Retraining Safety/Judgement: Insight into deficits(aware of precautions) Pain: 
Pain Scale 1: Numeric (0 - 10) Pain Intensity 1: 8 Pain Location 1: Back Pain Orientation 1: Lower Pain Description 1: Aching Pain Intervention(s) 1: Medication (see MAR) Activity Tolerance:  
Improving today; ambulating to bathroom Please refer to the flowsheet for vital signs taken during this treatment. After treatment:  
[] Patient left in no apparent distress sitting up in chair 
[x] Patient left in no apparent new distress in bed 
[x] Call bell left within reach 
[] Nursing notified 
[x] Caregiver present 
[] Bed alarm activated COMMUNICATION/COLLABORATION:  
The patients plan of care was discussed with: Physical Therapist and Registered Nurse Zeus AMARAL/SHAKA Time Calculation: 10 mins

## 2019-01-12 NOTE — PROGRESS NOTES
O: Pt seen and examined, She reports pain Is better controlled. She is still working on having a bowel movement. Denies Fevers. O/E Visit Vitals /83 (BP 1 Location: Right arm, BP Patient Position: At rest) Pulse (!) 104 Temp 97.6 °F (36.4 °C) Resp 16 Ht 5' 5.5\" (1.664 m) Wt 91.6 kg (202 lb) SpO2 95% BMI 33.10 kg/m² Physical Exam  
Constitutional: She is oriented to person, place, and time. Obese, mild Distress HENT:  
Head: Normocephalic and atraumatic. Eyes: EOM are normal. Pupils are equal, round, and reactive to light. Neck: Normal range of motion. Neck supple. Cardiovascular: Regular rhythm, S1 normal and S2 normal. Tachycardia present. Pulmonary/Chest: Effort normal and breath sounds normal.  
Abdominal: Soft. Bowel sounds are normal. She exhibits distension. There is no rebound and no guarding. Musculoskeletal: Normal range of motion. Arms: 
Bandaged, no gross Bleeding From bandages Neurological: She is alert and oriented to person, place, and time. GCS score is 15. Skin: Skin is warm. Psychiatric: Mood and affect normal.  
 
No results found for this or any previous visit (from the past 24 hour(s)). Xr Spine Sngl V (cross Table Lat) Result Date: 1/9/2019 IMPRESSION: Spine surgery in progress. INTERPRETATION PROVIDED FOR COMPLIANCE ONLY AT NO CHARGE Xr Abd (kub) Result Date: 1/11/2019 IMPRESSION: Large amount colonic stool and mild small bowel gaseous distention may represent ileus. Xr Fluoroscopy Under 60 Minutes Result Date: 1/9/2019 IMPRESSION:  Fluoroscopy time was provided. Fluoro dose:  153.07 mGy vk Assessment Tachycardia -? Physiologic from stress reaction vs pathologic Cardiac Enzymes negative EKG neg for ST elevation, no chest pains Since she has been immobile I have asked about performing an CTA chest to r/o PE, Also offered giving pain control before the scan, she is refusing it and aware of delay in diagnosis and treatment of conditions like PE which can lead to death. I have offered performing a VQ, duplex of the lower extremities and she refuses this also. Check TSH Free t4 Check Echo - Rule  Out valvular disease if she is agreeable. CTA chest or Duplex/VQ scan if she is agreeable. She Would like to talk to Primary team before doing these tests. Abdominal Pain - likely from Constipation , patient reports she is getting a suppository, KUb showing mild ileus vs Stool Anemia - mild Expected from surgical loss, monitor For Now at least recommend DVT ppx per Primary if oK D/W Dr Nathalie Queen. Thank you., 
we will sign off, please call us back when she Is agreeing to do the cardiac testing.

## 2019-01-12 NOTE — PROGRESS NOTES
Orthopaedics Daily Progress Note Date of Surgery:  1/9/2019 Patient: Blanka Brooks YOB: 1975  Age: 41 y.o. SUBJECTIVE:  
3 Days Post-Op following POSTERIOR SPINAL  FUSION T4 TO PELVIS WITH MULTIPLE GABE OSTEOTOMIES. The patient's post operative pain is controlled. No CP/SOB. No N/V. The patient's mobility will be evaluated today during PT sessions. Tried some clear jello and juice. Had nausea. Backed off to water again. Feels gas moving but has not passed gas yet. No BM. OBJECTIVE:  
 
Vital Signs:   
 
Visit Vitals /83 (BP 1 Location: Right arm, BP Patient Position: At rest) Pulse (!) 104 Temp 97.6 °F (36.4 °C) Resp 16 Ht 5' 5.5\" (1.664 m) Wt 91.6 kg (202 lb) SpO2 95% BMI 33.10 kg/m² Physical Exam: 
General: A&Ox3. The patient is cooperative, and in no acute distress. Respiratory: Respirations are unlabored. Surgical site(s): dressing clean, dry Musculoskeletal: Calves are soft, supple, and non-tender upon palpation. Motor 5/5. Neurological:  Neurovascularly intact with good dorsi and plantar flexion. Good bowel sounds. Laboratory Values: No results found for this or any previous visit (from the past 12 hour(s)). PLAN:  
 
S/P POSTERIOR SPINAL  FUSION T4 TO PELVIS WITH MULTIPLE GABE OSTEOTOMIES -Continue WBAT. -Mobilize and continue with PT/OT until discharged Hemodynamics Acute blood loss anemia as expected. Patient asymptomatic. Continue to monitor. Wound Monitor postop dressing; no postop dressing changes necessary. Reinforce PRN. Post Operative Pain Pain Control: stable, mild-to-moderate joint symptoms intermittently, reasonably well controlled by current meds. DVT Prophylaxis Continue with SCD'S, Ankle Pump Exercises. Discharge Disposition Discharge plan: Home with Home Health pending resolution of ileus. Good bowel sounds now. Go slow. Try clear liquids. Not passing gas yet. Once BM, may advance diet. Will follow tomorrow. Signed By: Rikki Hassan PA-C January 12, 2019 11:32 AM

## 2019-01-12 NOTE — PROGRESS NOTES
Pt. Refused morning labs, stated she only wanted to have test completed that were order by Dr. Maty Vaughn.

## 2019-01-12 NOTE — PROGRESS NOTES
Problem: Mobility Impaired (Adult and Pediatric) Goal: *Acute Goals and Plan of Care (Insert Text) Physical Therapy Goals Initiated 1/10/2019 1. Patient will move from supine to sit and sit to supine  in bed with supervision/set-up within 4 days. 2. Patient will perform sit to stand with supervision/set-up within 4 days. 3. Patient will ambulate with supervision/set-up for 150 feet with the least restrictive device within 4 days. 4. Patient will ascend/descend 4 stairs with B handrail(s) with minimal assistance/contact guard assist within 4 days. 5. Patient will verbalize and demonstrate understanding of spinal precautions (No bending, lifting greater than 5 lbs, or twisting; log-roll technique; frequent repositioning as instructed) within 4 days. physical Therapy TREATMENT Patient: Tayla Wood (35 y.o. female) Date: 1/12/2019 Diagnosis: Scoliosis [M41.9] Sinus tachycardia Procedure(s) (LRB): POSTERIOR SPINAL  FUSION T4 TO PELVIS WITH MULTIPLE GABE OSTEOTOMIES (N/A) 3 Days Post-Op Precautions: Fall, Back Chart, physical therapy assessment, plan of care and goals were reviewed. ASSESSMENT: 
Pt cleared for PT; RN notes pt c/o nausea this date. Pt agreeable to mobility as tolerated. Able to verbalize 3/3 spinal precautions. BP supine 134/88, sitting 134/96, . Pt completed bed mob supine->L side->sit with SBA, good technique. Transferred EOB<->RW and toilet<->RW with CGA. Pt tolerated amb to bathroom and on unit approx 40' with RW, CGA, decreased gait speed.  post activity. Pt declined up to chair at end of session due to R LE discomfort in sitting. Required min A to lift LEs with sit to L side lying. Pt encouraged by ambulation outside of room this session. Demo strong motivation to mobilize as tolerated and return to [facility]. Will progress as tolerated and continue to assess d/c needs. Progression toward goals: [x]    Improving appropriately and progressing toward goals 
[]    Improving slowly and progressing toward goals 
[]    Not making progress toward goals and plan of care will be adjusted PLAN: 
Patient continues to benefit from skilled intervention to address the above impairments. Continue treatment per established plan of care. Discharge Recommendations: To Be Determined Further Equipment Recommendations for Discharge:  rolling walker SUBJECTIVE:  
Patient stated I was up all night back and forth from the bathroom.  OBJECTIVE DATA SUMMARY:  
Critical Behavior: 
Neurologic State: Alert Orientation Level: Oriented X4 Cognition: Appropriate safety awareness, Appropriate for age attention/concentration, Appropriate decision making, Follows commands Safety/Judgement: Decreased awareness of need for safety Functional Mobility Training: 
Bed Mobility: 
Rolling: Supervision(use of bed rail) Supine to Sit: Stand-by assistance(L side to sit) Sit to Supine: Minimum assistance(sit to L side, assist for LEs) Transfers: 
Sit to Stand: Contact guard assistance Stand to Sit: Contact guard assistance Balance: 
Sitting: Intact Sitting - Static: Good (unsupported) Sitting - Dynamic: Fair (occasional) Standing: Impaired Standing - Static: Good Standing - Dynamic : FairAmbulation/Gait Training: 
Distance (ft): 40 Feet (ft) Assistive Device: Walker, rolling Ambulation - Level of Assistance: Contact guard assistance Base of Support: Widened Speed/Hilda: Pace decreased (<100 feet/min) Step Length: Left shortened;Right shortened Stairs: 
  
  
   
 
Neuro Re-Education: 
 
Therapeutic Exercises:  
 
Pain: 
Pain Scale 1: Numeric (0 - 10) Pain Intensity 1: 8 Pain Location 1: Back Pain Orientation 1: Lower Pain Description 1: Aching Pain Intervention(s) 1: Medication (see MAR) Activity Tolerance: Pt tolerated amb to bathroom and on unit, BP stable, -111 throughout session Please refer to the flowsheet for vital signs taken during this treatment. After treatment:  
[]    Patient left in no apparent distress sitting up in chair 
[x]    Patient left in no apparent distress in bed 
[x]    Call bell left within reach [x]    Nursing notified 
[x]    Caregiver present 
[]    Bed alarm activated COMMUNICATION/COLLABORATION:  
The patients plan of care was discussed with: Occupational Therapist and Registered Nurse Balaji Barnes, PT Time Calculation: 31 mins

## 2019-01-13 PROCEDURE — 65270000029 HC RM PRIVATE

## 2019-01-13 PROCEDURE — 97116 GAIT TRAINING THERAPY: CPT

## 2019-01-13 PROCEDURE — 74011000250 HC RX REV CODE- 250: Performed by: ORTHOPAEDIC SURGERY

## 2019-01-13 PROCEDURE — 74011250637 HC RX REV CODE- 250/637: Performed by: ORTHOPAEDIC SURGERY

## 2019-01-13 RX ADMIN — ACETAMINOPHEN 650 MG: 325 TABLET ORAL at 07:51

## 2019-01-13 RX ADMIN — OXYCODONE HYDROCHLORIDE 5 MG: 5 TABLET ORAL at 04:37

## 2019-01-13 RX ADMIN — DIAZEPAM 5 MG: 5 TABLET ORAL at 23:36

## 2019-01-13 RX ADMIN — OXYCODONE HYDROCHLORIDE 5 MG: 5 TABLET ORAL at 23:36

## 2019-01-13 RX ADMIN — OXYCODONE HYDROCHLORIDE 5 MG: 5 TABLET ORAL at 17:34

## 2019-01-13 RX ADMIN — ACETAMINOPHEN 650 MG: 325 TABLET ORAL at 18:16

## 2019-01-13 RX ADMIN — STANDARDIZED SENNA CONCENTRATE AND DOCUSATE SODIUM 1 TABLET: 8.6; 5 TABLET, FILM COATED ORAL at 18:16

## 2019-01-13 RX ADMIN — OXYCODONE HYDROCHLORIDE 5 MG: 5 TABLET ORAL at 11:20

## 2019-01-13 RX ADMIN — POLYETHYLENE GLYCOL 3350 17 G: 17 POWDER, FOR SOLUTION ORAL at 08:46

## 2019-01-13 RX ADMIN — OXYCODONE HYDROCHLORIDE 5 MG: 5 TABLET ORAL at 20:24

## 2019-01-13 RX ADMIN — OXYCODONE HYDROCHLORIDE 5 MG: 5 TABLET ORAL at 14:33

## 2019-01-13 RX ADMIN — ACETAMINOPHEN 650 MG: 325 TABLET ORAL at 01:33

## 2019-01-13 RX ADMIN — OXYCODONE HYDROCHLORIDE 5 MG: 5 TABLET ORAL at 07:51

## 2019-01-13 RX ADMIN — STANDARDIZED SENNA CONCENTRATE AND DOCUSATE SODIUM 1 TABLET: 8.6; 5 TABLET, FILM COATED ORAL at 08:46

## 2019-01-13 RX ADMIN — OXYCODONE HYDROCHLORIDE 10 MG: 5 TABLET ORAL at 01:33

## 2019-01-13 RX ADMIN — ACETAMINOPHEN 650 MG: 325 TABLET ORAL at 11:20

## 2019-01-13 RX ADMIN — ACETAMINOPHEN 650 MG: 325 TABLET ORAL at 23:36

## 2019-01-13 NOTE — PROGRESS NOTES
Bedside shift change report given to Morenita Botello (oncoming nurse) by Francheska Jay RN (offgoing nurse). Report included the following information SBAR, Kardex, OR Summary, Procedure Summary, Intake/Output, MAR and Recent Results.

## 2019-01-13 NOTE — PROGRESS NOTES
Pt. IV infiltrated hand was swollen and cold, IV was removed PT. Did not wish to have another IV inserted at this time, no IV meds prescribed at this time except fluid and pt. Has been able to maintain PO intake with no nausea since dinner. Pt. Stated if there is a need for an IV in the morning than she would be willing to have one put in.

## 2019-01-13 NOTE — PROGRESS NOTES
Orthopaedics Daily Progress Note Date of Surgery:  1/9/2019 Patient: Romana Abad YOB: 1975  Age: 41 y.o. SUBJECTIVE:  
4 Days Post-Op following POSTERIOR SPINAL  FUSION T4 TO PELVIS WITH MULTIPLE GABE OSTEOTOMIES. The patient's post operative pain is controlled. No CP/SOB. The patient's mobility will be evaluated today during PT sessions. Already walked down the lyon today, ambulated to the bathroom and is no back in bed. Tolerating PO clears better today. No N/V. Working on jello and tea. Passing gas since yesterday but no BM yet. OBJECTIVE:  
 
Vital Signs:   
 
Visit Vitals /76 (BP 1 Location: Right arm) Pulse (!) 102 Temp 98.7 °F (37.1 °C) Resp 16 Ht 5' 5.5\" (1.664 m) Wt 91.6 kg (202 lb) SpO2 95% BMI 33.10 kg/m² Physical Exam: 
General: A&Ox3. The patient is cooperative, and in no acute distress. Respiratory: Respirations are unlabored. Surgical site(s): dressing clean, dry Musculoskeletal: Calves are soft, supple, and non-tender upon palpation. Motor 5/5. Neurological:  Neurovascularly intact with good dorsi and plantar flexion. Laboratory Values: No results found for this or any previous visit (from the past 12 hour(s)). PLAN:  
 
S/P POSTERIOR SPINAL  FUSION T4 TO PELVIS WITH MULTIPLE GABE OSTEOTOMIES -Continue WBAT. -Mobilize and continue with PT/OT until discharged Hemodynamics Acute blood loss anemia as expected. Patient asymptomatic. Continue to monitor. Wound Monitor postop dressing; no postop dressing changes necessary. Reinforce PRN. Post Operative Pain Pain Control: stable, mild-to-moderate joint symptoms intermittently, reasonably well controlled by current meds. DVT Prophylaxis Continue with SCD'S, Ankle Pump Exercises.    
 
Discharge Disposition Discharge plan: Home with Home Health pending resolution of ileus. Good bowel sounds now. Go slow. Continue clear liquids. Passing gas now. Once BM, may advance diet. Dr. Keo Capps team back tomorrow. Signed By: Mitra Conroy PA-C January 13, 2019 10:13 AM

## 2019-01-13 NOTE — PROGRESS NOTES
Problem: Mobility Impaired (Adult and Pediatric) Goal: *Acute Goals and Plan of Care (Insert Text) Physical Therapy Goals Initiated 1/10/2019 1. Patient will move from supine to sit and sit to supine  in bed with supervision/set-up within 4 days. 2. Patient will perform sit to stand with supervision/set-up within 4 days. 3. Patient will ambulate with supervision/set-up for 150 feet with the least restrictive device within 4 days. 4. Patient will ascend/descend 4 stairs with B handrail(s) with minimal assistance/contact guard assist within 4 days. 5. Patient will verbalize and demonstrate understanding of spinal precautions (No bending, lifting greater than 5 lbs, or twisting; log-roll technique; frequent repositioning as instructed) within 4 days. physical Therapy TREATMENT Patient: Jami Adhikari (87 y.o. female) Date: 1/13/2019 Diagnosis: Scoliosis [M41.9] Sinus tachycardia Procedure(s) (LRB): POSTERIOR SPINAL  FUSION T4 TO PELVIS WITH MULTIPLE GABE OSTEOTOMIES (N/A) 4 Days Post-Op Precautions: Fall, Back Chart, physical therapy assessment, plan of care and goals were reviewed. ASSESSMENT: 
Patient observed walking this am and agreeable to PT intervention. Transferred EOB with supervision and cues. Gait training x150' with RW and SBA. Noted slow jr and elevated scapulae but significant improvement with cues. Discussed weaning from a RW but she deferred d/t fear of her right LE buckling. She continues with tachycardia with exertion with -144 max up from a resting HR of 98 while remaining asymptomatic. Deferred stairs today d/t her HR but discussed for next visit. Anticipate continued progress and discharge home with HHPT once HR is controlled. Progression toward goals: 
[x]      Improving appropriately and progressing toward goals 
[]      Improving slowly and progressing toward goals 
[]      Not making progress toward goals and plan of care will be adjusted PLAN: 
 Patient continues to benefit from skilled intervention to address the above impairments. Continue treatment per established plan of care. Discharge Recommendations:  Home Health Further Equipment Recommendations for Discharge:  to be determined SUBJECTIVE:  
Patient stated I am just nervous. I am afraid my leg will give away after it did that first time.  The patient stated 3/3 back precautions. Reviewed all 3 with patient. OBJECTIVE DATA SUMMARY:  
Critical Behavior: 
Neurologic State: Alert Orientation Level: Oriented X4 Cognition: Appropriate decision making, Appropriate for age attention/concentration, Appropriate safety awareness, Follows commands Safety/Judgement: Insight into deficits(aware of precautions) Functional Mobility Training: 
Bed Mobility: 
Log Rolling: Supervision Supine to Sit: Supervision Sit to Supine: Contact guard assistance Scooting: Minimum assistance Transfers: 
Sit to Stand: Contact guard assistance Stand to Sit: Contact guard assistance Bed to Chair: Contact guard assistance Balance: 
Sitting: Intact Sitting - Static: Good (unsupported) Sitting - Dynamic: Fair (occasional) Standing: Impaired Standing - Static: Good Standing - Dynamic : FairAmbulation/Gait Training: 
Distance (ft): 150 Feet (ft) Assistive Device: Walker, rolling;Gait belt Ambulation - Level of Assistance: Contact guard assistance Gait Abnormalities: Decreased step clearance;Shuffling gait Base of Support: Widened Speed/Hilda: Pace decreased (<100 feet/min) Step Length: Left shortened;Right shortened Stairs: Therapeutic Exercises:  
 
Pain: 
Pain Scale 1: Numeric (0 - 10) Pain Intensity 1: 7 Pain Location 1: Back Pain Orientation 1: Lower Pain Description 1: Aching Pain Intervention(s) 1: Medication (see MAR) Activity Tolerance:  
 
Please refer to the flowsheet for vital signs taken during this treatment. After treatment:  
[]  Patient left in no apparent distress sitting up in chair 
[x]  Patient left in no apparent distress in bed 
[x]  Call bell left within reach [x]  Nursing notified 
[]  Caregiver present 
[]  Bed alarm activated COMMUNICATION/COLLABORATION:  
The patients plan of care was discussed with: Registered Nurse Ramírez Kennedy, PT, DPT Time Calculation: 26 mins

## 2019-01-14 PROCEDURE — 65270000029 HC RM PRIVATE

## 2019-01-14 PROCEDURE — 74011250637 HC RX REV CODE- 250/637: Performed by: ORTHOPAEDIC SURGERY

## 2019-01-14 PROCEDURE — 97116 GAIT TRAINING THERAPY: CPT

## 2019-01-14 PROCEDURE — 74011000250 HC RX REV CODE- 250: Performed by: ORTHOPAEDIC SURGERY

## 2019-01-14 PROCEDURE — 94760 N-INVAS EAR/PLS OXIMETRY 1: CPT

## 2019-01-14 RX ORDER — FACIAL-BODY WIPES
10 EACH TOPICAL DAILY
Status: DISCONTINUED | OUTPATIENT
Start: 2019-01-14 | End: 2019-01-15 | Stop reason: HOSPADM

## 2019-01-14 RX ADMIN — OXYCODONE HYDROCHLORIDE 5 MG: 5 TABLET ORAL at 13:58

## 2019-01-14 RX ADMIN — STANDARDIZED SENNA CONCENTRATE AND DOCUSATE SODIUM 1 TABLET: 8.6; 5 TABLET, FILM COATED ORAL at 09:25

## 2019-01-14 RX ADMIN — DIAZEPAM 5 MG: 5 TABLET ORAL at 23:08

## 2019-01-14 RX ADMIN — BISACODYL 10 MG: 10 SUPPOSITORY RECTAL at 11:53

## 2019-01-14 RX ADMIN — OXYCODONE HYDROCHLORIDE 5 MG: 5 TABLET ORAL at 20:01

## 2019-01-14 RX ADMIN — DIAZEPAM 5 MG: 5 TABLET ORAL at 05:22

## 2019-01-14 RX ADMIN — POLYETHYLENE GLYCOL 3350 17 G: 17 POWDER, FOR SOLUTION ORAL at 09:25

## 2019-01-14 RX ADMIN — OXYCODONE HYDROCHLORIDE 5 MG: 5 TABLET ORAL at 17:07

## 2019-01-14 RX ADMIN — STANDARDIZED SENNA CONCENTRATE AND DOCUSATE SODIUM 1 TABLET: 8.6; 5 TABLET, FILM COATED ORAL at 17:07

## 2019-01-14 RX ADMIN — OXYCODONE HYDROCHLORIDE 10 MG: 5 TABLET ORAL at 10:44

## 2019-01-14 RX ADMIN — OXYCODONE HYDROCHLORIDE 5 MG: 5 TABLET ORAL at 02:47

## 2019-01-14 RX ADMIN — OXYCODONE HYDROCHLORIDE 10 MG: 5 TABLET ORAL at 05:19

## 2019-01-14 RX ADMIN — ACETAMINOPHEN 650 MG: 325 TABLET ORAL at 23:08

## 2019-01-14 RX ADMIN — ACETAMINOPHEN 650 MG: 325 TABLET ORAL at 17:07

## 2019-01-14 RX ADMIN — OXYCODONE HYDROCHLORIDE 5 MG: 5 TABLET ORAL at 07:33

## 2019-01-14 RX ADMIN — ACETAMINOPHEN 650 MG: 325 TABLET ORAL at 07:33

## 2019-01-14 RX ADMIN — OXYCODONE HYDROCHLORIDE 10 MG: 5 TABLET ORAL at 23:08

## 2019-01-14 RX ADMIN — ACETAMINOPHEN 650 MG: 325 TABLET ORAL at 11:51

## 2019-01-14 NOTE — PROGRESS NOTES
Chart reviewed and patient received supine in bed with family member present. Patient stating she is exhausted from doing the stairs and having her first BM in 6 days. Will follow up for OT intervention tomorrow as able and appropriate. Thank you.

## 2019-01-14 NOTE — PROGRESS NOTES
Cm continuing to follow for transitions of care, discussed patient during rounds. Patient will return home with  and [facility] when stable; today vs tomorrow pending clearance from therapy. No further CM needs at this time.  
Bard Otis EASTONW, ACM

## 2019-01-14 NOTE — PROGRESS NOTES
S: Making progress with PT. Mild tachycardia when up moving but no CP, dizziness, SOB. No Abd pain this AM. + flatus. No BM yet. Tolerating clears well. Mild pain in back while sleeping. No tingling to toes. O: 
/86 (BP 1 Location: Left arm, BP Patient Position: At rest)   Pulse 91   Temp 98 °F (36.7 °C)   Resp 16   Ht 5' 5.5\" (1.664 m)   Wt 91.6 kg (202 lb)   SpO2 97%   BMI 33.10 kg/m² NAD, alert, supine Nonlabored resp on RA Abd soft, ND, NT 
Dressing c,d,i, reinforced BLE: WWP, Motor 5/5 in ehl, fhl, ta, gs, hamstrings, quads. SILT in L4-S1. 
  
No results found for this or any previous visit (from the past 24 hour(s)). A/P: 43yo healthy female s/p T4-Pelvis posterior spinal fusion for scoliosis on 1/9. 
  
Activity: as tolerated, WBAT Diet: clears, advance slowly Encourage PO hydration Pain: PO 
Docusate, Senna, miralax, bisacodyl DVT ppx: SCDs, ambulate Drains: None Labs: None Consults: PT 
Dispo: Pending physical therapy and medical stability, likely today or tomorrow

## 2019-01-14 NOTE — PROGRESS NOTES
Problem: Mobility Impaired (Adult and Pediatric) Goal: *Acute Goals and Plan of Care (Insert Text) Physical Therapy Goals Initiated 1/10/2019 1. Patient will move from supine to sit and sit to supine  in bed with supervision/set-up within 4 days. 2. Patient will perform sit to stand with supervision/set-up within 4 days. 3. Patient will ambulate with supervision/set-up for 150 feet with the least restrictive device within 4 days. 4. Patient will ascend/descend 4 stairs with B handrail(s) with minimal assistance/contact guard assist within 4 days. 5. Patient will verbalize and demonstrate understanding of spinal precautions (No bending, lifting greater than 5 lbs, or twisting; log-roll technique; frequent repositioning as instructed) within 4 days. physical Therapy TREATMENT Patient: Cezar Díaz (4 y.o. female) Date: 1/14/2019 Diagnosis: Scoliosis [M41.9] Sinus tachycardia Procedure(s) (LRB): POSTERIOR SPINAL  FUSION T4 TO PELVIS WITH MULTIPLE GABE OSTEOTOMIES (N/A) 5 Days Post-Op Precautions: Fall, Back Chart, physical therapy assessment, plan of care and goals were reviewed. ASSESSMENT: 
Chart reviewed, RN approved, patient received lying in bed agreeable to work with therapy. Patient initial /78. Patient with minimal pain currently at rest, but reports having a bad night due to pain. Transferred supine to sitting EOB with supervision using log roll technique. Patient able to adhere to her back precautions independently. Patient transferred sit<>stand with SBA and ambulated 20ft with SBA using RW. Patient did not rely on walker for support, and would benefit from attempting ambulation without walker next session. Session focused on stair training. Patient navigated 14 stairs with CGA using B handrails and demonstrating step-to pattern. Patient's HR elevated to 147 bpm post stairs.   Session ended with patient sitting up in chair with all needs met, RN present, and  present. Final /85,  bpm.  Recommending  PT upon discharge. Progression toward goals: 
[x]    Improving appropriately and progressing toward goals 
[]    Improving slowly and progressing toward goals 
[]    Not making progress toward goals and plan of care will be adjusted PLAN: 
Patient continues to benefit from skilled intervention to address the above impairments. Continue treatment per established plan of care. Discharge Recommendations:  Home Health Further Equipment Recommendations for Discharge:  TBD SUBJECTIVE:  
Patient stated Am I going to need a walker at home? Jamie Osuna OBJECTIVE DATA SUMMARY:  
Critical Behavior: 
Neurologic State: Alert Orientation Level: Oriented X4 Cognition: Appropriate for age attention/concentration, Appropriate safety awareness, Follows commands Safety/Judgement: Insight into deficits(aware of precautions) Functional Mobility Training: 
Bed Mobility: 
  
Supine to Sit: Supervision Sit to Supine: (ended in chair) Transfers: 
Sit to Stand: Stand-by assistance Stand to Sit: Stand-by assistance Bed to Chair: Stand-by assistance Balance: 
Sitting: Intact Standing: Intact; With support Standing - Static: Good Standing - Dynamic : GoodAmbulation/Gait Training: 
Distance (ft): 20 Feet (ft) Assistive Device: Walker, rolling;Gait belt Ambulation - Level of Assistance: Contact guard assistance Gait Abnormalities: Decreased step clearance;Shuffling gait Base of Support: Widened Speed/Hilda: Shuffled; Slow Step Length: Left shortened;Right shortened Stairs: 
Number of Stairs Trained: 14 Stairs - Level of Assistance: Contact guard assistance Rail Use: Both Neuro Re-Education: 
 
Therapeutic Exercises:  
 
Pain: 
Pain Scale 1: Numeric (0 - 10) Pain Intensity 1: 7 Pain Location 1: Back Pain Orientation 1: Posterior Pain Description 1: Aching Pain Intervention(s) 1: Medication (see MAR) Activity Tolerance:  
See above. HR elevated to 147 bpm with activity Please refer to the flowsheet for vital signs taken during this treatment. After treatment:  
[x]    Patient left in no apparent distress sitting up in chair 
[]    Patient left in no apparent distress in bed 
[x]    Call bell left within reach [x]    Nursing notified 
[x]    Caregiver present 
[]    Bed alarm activated COMMUNICATION/COLLABORATION:  
The patients plan of care was discussed with: Registered Nurse Aracelis Cortes PT, DPT Time Calculation: 27 mins

## 2019-01-14 NOTE — PROGRESS NOTES
Bedside shift change report given to Nanette West (oncoming nurse) by Rosmery Jarvis (offgoing nurse). Report included the following information SBAR, Kardex, Procedure Summary, Intake/Output, MAR and Recent Results.

## 2019-01-15 VITALS
OXYGEN SATURATION: 96 % | SYSTOLIC BLOOD PRESSURE: 114 MMHG | HEART RATE: 100 BPM | TEMPERATURE: 98.1 F | DIASTOLIC BLOOD PRESSURE: 76 MMHG | BODY MASS INDEX: 32.47 KG/M2 | RESPIRATION RATE: 16 BRPM | WEIGHT: 202 LBS | HEIGHT: 66 IN

## 2019-01-15 PROCEDURE — 97535 SELF CARE MNGMENT TRAINING: CPT

## 2019-01-15 PROCEDURE — 74011250637 HC RX REV CODE- 250/637: Performed by: ORTHOPAEDIC SURGERY

## 2019-01-15 PROCEDURE — 97116 GAIT TRAINING THERAPY: CPT | Performed by: PHYSICAL THERAPIST

## 2019-01-15 RX ORDER — OXYCODONE HYDROCHLORIDE 5 MG/1
5-10 TABLET ORAL
Qty: 90 TAB | Refills: 0 | Status: SHIPPED | OUTPATIENT
Start: 2019-01-15 | End: 2019-01-25

## 2019-01-15 RX ORDER — AMOXICILLIN 250 MG
1 CAPSULE ORAL 2 TIMES DAILY
Qty: 30 TAB | Refills: 0 | Status: SHIPPED | OUTPATIENT
Start: 2019-01-15

## 2019-01-15 RX ORDER — DIAZEPAM 5 MG/1
5 TABLET ORAL
Qty: 21 TAB | Refills: 0 | Status: SHIPPED | OUTPATIENT
Start: 2019-01-15 | End: 2019-01-22

## 2019-01-15 RX ADMIN — OXYCODONE HYDROCHLORIDE 5 MG: 5 TABLET ORAL at 02:05

## 2019-01-15 RX ADMIN — OXYCODONE HYDROCHLORIDE 5 MG: 5 TABLET ORAL at 05:39

## 2019-01-15 RX ADMIN — OXYCODONE HYDROCHLORIDE 5 MG: 5 TABLET ORAL at 08:25

## 2019-01-15 RX ADMIN — OXYCODONE HYDROCHLORIDE 5 MG: 5 TABLET ORAL at 11:29

## 2019-01-15 RX ADMIN — ACETAMINOPHEN 650 MG: 325 TABLET ORAL at 05:39

## 2019-01-15 NOTE — PROGRESS NOTES
Reviewed discharge instructions with patient and family;  
Demonstrated dressing change & gave dressing supplies; Although Dr. Keven Akins came in during dressing change and advised ok to just wear soft, old TShirt at home with no dressing 
gave Rx for oxycodone, valium and senna; (5 mg oxy given prior to discharge) 
patient expressed understanding and denied questions.

## 2019-01-15 NOTE — PROGRESS NOTES
Problem: Self Care Deficits Care Plan (Adult) Goal: *Acute Goals and Plan of Care (Insert Text) Occupational Therapy Goals Initiated 1/10/2019 1. Patient will perform lower body dressing with modified independence using AE PRN within 4 days. 2.  Patient will perform toileting with modified independence using most appropriate DME within 4 days. 3.  Patient will perform grooming task standing at modified independence within 4 days. 4.  Patient will don/doff back brace at supervision/set-up within 4 days. 5.  Patient will verbalize/demonstrate 3/3 back precautions during ADL tasks without cues within 4 days. Occupational Therapy TREATMENT/DISCHARGE Patient: Jaquelin Delgadillo (32 y.o. female) Date: 1/15/2019 Diagnosis: Scoliosis [M41.9] Sinus tachycardia Procedure(s) (LRB): POSTERIOR SPINAL  FUSION T4 TO PELVIS WITH MULTIPLE GABE OSTEOTOMIES (N/A) 6 Days Post-Op Precautions: Fall, Back Chart, occupational therapy assessment, plan of care, and goals were reviewed. ASSESSMENT: 
Patient is currently supervision for all ADL transfers and tasks with OT educating patient on toilet tongs today and patient demonstrating competence. Patient also verbalized having purchased a tub transfer bench for bathing and a body pillow for comfort with sleeping hygiene. Patient safe to discharge home with her  and [address] [place] [address] at this time. Progression toward goals: 
[x]            Improving appropriately and progressing toward goals []            Improving slowly and progressing toward goals []            Not making progress toward goals and plan of care will be adjusted PLAN: 
Patient will be discharged from occupational therapy at this time. Rationale for discharge: 
[x]   Goals Achieved 
[]   [address] 
[]   Patient not participating in therapy 
[]   Other: 
Discharge Recommendations:  [address] Further Equipment Recommendations for Discharge:  TBD SUBJECTIVE:  
 Patient stated Cierra Osorio got the tub bench online so it will be there when I get home.  OBJECTIVE DATA SUMMARY:  
Cognitive/Behavioral Status: 
Neurologic State: Alert Orientation Level: Oriented X4 Cognition: Appropriate for age attention/concentration Perception: Appears intact Perseveration: No perseveration noted Safety/Judgement: Awareness of environment Functional Mobility and Transfers for ADLs:Bed Mobility: 
Supine to Sit: Supervision Transfers: 
Sit to Stand: Stand-by assistance Functional Transfers Toilet Transfer : Supervision Bed to Chair: Supervision Balance: 
Sitting: Intact Sitting - Static: Good (unsupported) Sitting - Dynamic: Fair (occasional) Standing: Intact; With support ADL Intervention: 
  
 
Grooming Brushing Teeth: Supervision/set-up Toileting Toileting Assistance: Supervision/set up Cognitive Retraining Safety/Judgement: Awareness of environment Pain: 
Pain Scale 1: Numeric (0 - 10) Pain Intensity 1: 7 Pain Location 1: Back Pain Orientation 1: Posterior Pain Description 1: Aching Pain Intervention(s) 1: Medication (see MAR) Activity Tolerance: VSS Please refer to the flowsheet for vital signs taken during this treatment. After treatment:  
[x] Patient left in no apparent distress sitting up in chair 
[] Patient left in no apparent distress in bed 
[x] Call bell left within reach [x] Nursing notified 
[x] Caregiver present 
[] Bed alarm activated COMMUNICATION/COLLABORATION:  
The patients plan of care was discussed with: Physical Therapist and Registered Nurse Fatmata Dalton Time Calculation: 25 mins

## 2019-01-15 NOTE — PROGRESS NOTES
Problem: Mobility Impaired (Adult and Pediatric) Goal: *Acute Goals and Plan of Care (Insert Text) Physical Therapy Goals Initiated 1/10/2019 1. Patient will move from supine to sit and sit to supine  in bed with supervision/set-up within 4 days. 2. Patient will perform sit to stand with supervision/set-up within 4 days. 3. Patient will ambulate with supervision/set-up for 150 feet with the least restrictive device within 4 days. 4. Patient will ascend/descend 4 stairs with B handrail(s) with minimal assistance/contact guard assist within 4 days. 5. Patient will verbalize and demonstrate understanding of spinal precautions (No bending, lifting greater than 5 lbs, or twisting; log-roll technique; frequent repositioning as instructed) within 4 days. physical Therapy TREATMENT Patient: Leilani Su (17 y.o. female) Date: 1/15/2019 Diagnosis: Scoliosis [M41.9] Sinus tachycardia Procedure(s) (LRB): POSTERIOR SPINAL  FUSION T4 TO PELVIS WITH MULTIPLE GABE OSTEOTOMIES (N/A) 6 Days Post-Op Precautions: Fall, Back Chart, physical therapy assessment, plan of care and goals were reviewed. ASSESSMENT: 
Patient making steady progress toward goals. Patient continues to be rather anxious regarding mobility but moving well. Up in chair and needs SBA for transfers. Amb approx 100 feet with RW and SBA with no overt LOB. Patient's  going to purchase RW from pharmacy downstairs for home. Recommend  PT follow up for mobility progression. Patient is safe to DC from mobility standpoint. Progression toward goals: 
[x]      Improving appropriately and progressing toward goals 
[]      Improving slowly and progressing toward goals 
[]      Not making progress toward goals and plan of care will be adjusted PLAN: 
Patient continues to benefit from skilled intervention to address the above impairments. Continue treatment per established plan of care. Discharge Recommendations:  Home Health Further Equipment Recommendations for Discharge:   to buy RW SUBJECTIVE:  
Patient stated I just have a lot of questions.  The patient stated 3/3 back precautions. Reviewed all 3 with patient. OBJECTIVE DATA SUMMARY:  
Critical Behavior: 
Neurologic State: Alert Orientation Level: Oriented X4 Cognition: Appropriate decision making, Appropriate for age attention/concentration, Appropriate safety awareness, Follows commands Safety/Judgement: Insight into deficits(aware of precautions) Functional Mobility Training: 
 
Transfers: 
Sit to Stand: Stand-by assistance Stand to Sit: Stand-by assistance Balance: 
Sitting: Intact Sitting - Static: Good (unsupported) Sitting - Dynamic: Fair (occasional) Standing: Intact; With supportAmbulation/Gait Training: 
Distance (ft): 100 Feet (ft) Assistive Device: Gait belt;Walker, rolling Ambulation - Level of Assistance: Contact guard assistance Gait Abnormalities: Decreased step clearance Base of Support: Widened Stance: Right decreased Speed/Hilda: Pace decreased (<100 feet/min); Shuffled Step Length: Left shortened;Right shortened Pain: 
Pain Scale 1: Numeric (0 - 10) Pain Intensity 1: 7 Pain Location 1: Back Pain Orientation 1: Posterior Pain Description 1: Aching Pain Intervention(s) 1: Medication (see MAR) Activity Tolerance: VSS Please refer to the flowsheet for vital signs taken during this treatment. After treatment:  
[x]  Patient left in no apparent distress sitting up in chair 
[]  Patient left in no apparent distress in bed 
[x]  Call bell left within reach [x]  Nursing notified 
[x]  Caregiver present 
[]  Bed alarm activated COMMUNICATION/COLLABORATION:  
The patients plan of care was discussed with: Physical Therapist, Occupational Therapist and Registered Nurse Aida Garcia PT, DPT Time Calculation: 17 mins

## 2019-01-15 NOTE — DISCHARGE INSTRUCTIONS
Patient Education        Spinal Fusion for Scoliosis in Children: What to Expect at [address] [place] [address] Road child has had spinal fusion surgery to treat his or her scoliosis. Your doctor did the surgery through a cut, called an incision, in your child's back. Metal fasteners were attached to the curved parts of your child's spine to make it straighter. Then small pieces of bone, called grafts, were placed like bridges between pairs of vertebrae. As your child recovers, the grafts will become part of the spine and help keep it from curving. It's normal for children to have pain after spinal fusion surgery, but your child is getting medicines to help manage the pain. Your child may be able to go back to school after about 3 to 6 weeks. Full recovery may take 6 to 12 months. Your child will need lots of emotional support during this time. This care sheet gives you a general idea about how long it will take for your child to recover. But each child recovers at a different pace. Follow the steps below to help your child get better as quickly as possible. How can you care for your child at home? Activity    · Have your child rest when he or she feels tired. Getting enough sleep will help your child recover.     · Your child may shower 24 to 48 hours after surgery, if your doctor okays it. Pat the incision dry. Do not let your child take a bath for the first 2 weeks, or until your doctor tells you it is okay.     · Have your child try to walk each day. Start by walking a little more than he or she did the day before. Bit by bit, increase the amount your child walks. Walking boosts blood flow and helps prevent pneumonia and constipation.     · Your child should not ride a bike, play running games, or take part in gym class for 6 to 9 months or until your doctor says it is okay.     · For 6 to 12 months, make sure your child avoids lifting anything that would make him or her strain.  This may include a heavy backpack, heavy grocery bags and milk containers, or bags of cat litter or dog food. Ask your doctor when your child can do activities that could jar the spine, such as competitive sports, skating, and skiing. Diet    · Your child can eat his or her normal diet. If your child's stomach is upset, try bland, low-fat foods like plain rice, broiled chicken, toast, and yogurt.     · Have your child drink plenty of fluids to avoid becoming dehydrated.     · You may notice a change in your child's bowel habits right after surgery. This is common. If your child has not had a bowel movement after a couple of days, call your doctor. Medicines    · Your doctor will tell you if and when your child can restart his or her medicines. The doctor will also give you instructions about your child taking any new medicines.     · Be safe with medicines. Give pain medicines exactly as directed. ? If the doctor gave your child a prescription medicine for pain, give it as prescribed. ? If your child is not taking a prescription pain medicine, ask your doctor if your child can take an over-the-counter medicine.     · If you think the pain medicine is making your child sick to the stomach:  ? Give the medicine after meals (unless your doctor has told you not to). ? Ask your doctor for a different pain medicine.     · If your doctor prescribed antibiotics for your child, give them as directed. Do not stop using them just because your child feels better. Your child needs to take the full course of antibiotics. Incision care    · Your child will go home with a bandage and stitches or staples over the incision the doctor made. Your doctor may remove your child's bandage and stitches or staples 10 to 14 days after the surgery. If your child has stitches that dissolve in the body over time, the doctor will not need to take them out.  Your doctor will tell you if your child needs to go back to have any stitches removed.     · If your child has strips of tape on the incision the doctor made, leave the tape on for a week or until it falls off. Or follow your doctor's instructions for removing the tape.     · Wash the area daily with warm, soapy water, and pat it dry. Don't use hydrogen peroxide or alcohol, which can slow healing. You may cover the area with a gauze bandage if it weeps or rubs against clothing. Change the bandage every day.     · Keep the area clean and dry. Follow-up care is a key part of your child's treatment and safety. Be sure to make and go to all appointments, and call your doctor if your child is having problems. It's also a good idea to know your child's test results and keep a list of the medicines your child takes. When should you call for help? Call 911 anytime you think your child may need emergency care. For example, call if:    · Your child passes out (loses consciousness).     · Your child has symptoms of a blood clot in his or her lung (called a pulmonary embolism). These may include:  ? Sudden chest pain. ? Trouble breathing. ? Coughing up blood.     · Your child is unable to move an arm or a leg at all.   [facility] your doctor now or seek immediate medical care if:    · Your child has symptoms of infection, such as:  ? Increased pain, swelling, warmth, or redness. ? Red streaks or pus. ? A fever.     · Your child bleeds through the dressing.     · Your child has severe or worsening pain in his or her back.     · Your child has symptoms of a blood clot in his or her leg, such as:  ? Pain in the calf, back of the knee, thigh, or groin. ? Redness and swelling in the leg or groin.     · Your child loses bladder or bowel control.     · Your child has new or worse symptoms in his or her arms, legs, chest, belly, or buttocks. Symptoms may include:  ? Numbness or tingling. ? Weakness.   ? Pain.    Watch closely for changes in your child's health, and be sure to contact your doctor if:    · Your child is not getting better as expected. Where can you learn more? Go to http://layne-tarik.info/. Enter O246 in the search box to learn more about \"Spinal Fusion for Scoliosis in Children: What to Expect at Home. \"  Current as of: November 29, 2017  Content Version: 11.8  © 6673-3192 [employer], [employer]. Care instructions adapted under license by [employer] (which disclaims liability or warranty for this information). If you have questions about a medical condition or this instruction, always ask your healthcare professional. [address] any warranty or liability for your use of this information.

## 2019-01-15 NOTE — DISCHARGE SUMMARY
Physician Discharge Summary     Patient ID:  Mau Malik  [id number]  28 y.o.  1975    Allergies: Patient has no known allergies. Admit Date: 1/9/2019    Discharge Date: 1/15/2019    * Admission Diagnoses: Scoliosis [M41.9]    * Discharge Diagnoses:    Hospital Problems as of 1/15/2019 Date Reviewed: 1/11/2019          Codes Class Noted - Resolved POA    * (Principal) Sinus tachycardia ICD-10-CM: R00.0  ICD-9-CM: 427.89  1/11/2019 - Present Unknown        Scoliosis ICD-10-CM: M41.9  ICD-9-CM: 737.30  Unknown - Present Unknown               Admission Condition: Good    * Discharge Condition: good    * Procedures: Procedure(s):  POSTERIOR SPINAL  FUSION T4 TO PELVIS WITH MULTIPLE GABE OSTEOTOMIES    [facility] Course: The patient was admitted from home for posterior spinal fusion. The procedure was tolerated well. Post operatively she developed abdominal pain and tachycardia. The tachycardia resolved with observation. The abdominal pain resolved after a period of keeping NPO. She was discharged home after working with PT and when tolerating diet and pain controlled. Consults: Hospitalist    Significant Diagnostic Studies: KUB showed mild distension without high grade features    * Disposition: Home    Discharge Medications:   Current Discharge Medication List      START taking these medications    Details   diazePAM (VALIUM) 5 mg tablet Take 1 Tab by mouth every eight (8) hours as needed (Muscle Spasms) for up to 7 days. Max Daily Amount: 15 mg.  Qty: 21 Tab, Refills: 0    Associated Diagnoses: Scoliosis of thoracolumbar spine, unspecified scoliosis type      oxyCODONE IR (ROXICODONE) 5 mg immediate release tablet Take 1-2 Tabs by mouth every four (4) hours as needed for up to 10 days.  Max Daily Amount: 60 mg.  Qty: 90 Tab, Refills: 0    Associated Diagnoses: Scoliosis of thoracolumbar spine, unspecified scoliosis type      senna-docusate (PERICOLACE) 8.6-50 mg per tablet Take 1 Tab by mouth two (2) times a day. Qty: 30 Tab, Refills: 0    Associated Diagnoses: Scoliosis of thoracolumbar spine, unspecified scoliosis type         STOP taking these medications       ibuprofen (MOTRIN) 200 mg tablet Comments:   Reason for Stopping:               * Follow-up Care/Patient Instructions:   Activity: No heavy lifting for weeks  Diet: Regular Diet  Wound Care: As directed    Follow-up Information     Follow up With Specialties Details Why [address]    José Crump MD Internal Medicine   [facility] [place] [address]  [address] [place] [address]      Karolina Li MD Orthopedic Surgery Schedule an appointment as soon as possible for a visit For wound re-check [address] [facility] [address]  [phone]            Signed:  Belem Alvarado MD  1/15/2019  6:53 AM

## 2019-01-15 NOTE — PROGRESS NOTES
S: 
No overnight events. Slept well. Pain controlled. + BM yesterday. Did stairs with PT. Tolerating light diet. Feels ready to go home today. Segun CP, SOB, Abd pain, numbness/tingling in feet. 
  
O: 
/74 (BP 1 Location: Left arm, BP Patient Position: At rest)   Pulse (!) 103   Temp 98.7 °F (37.1 °C)   Resp 18   Ht 5' 5.5\" (1.664 m)   Wt 91.6 kg (202 lb)   SpO2 93%   BMI 33.10 kg/m²  
  
NAD, alert, sitting up in chair Nonlabored resp on RA Abd soft, ND, NT 
Dressing c,d,i, reinforced BLE: WWP, Motor grossly intact in in ehl, fhl, ta, gs, hamstrings, quads. SILT in L4-S1. Mild tingling in right lateral thigh. 
  
No results found for this or any previous visit (from the past 12 hour(s)). 
  
A/P: 44yo healthy female s/p T4-Pelvis posterior spinal fusion for scoliosis on 1/9. 
  
Activity: as tolerated, WBAT Diet: light diet Encourage PO hydration Pain: PO 
Docusate, Senna, miralax, bisacodyl DVT ppx: SCDs, ambulate Drains: None Labs: None Consults: PT/OT Dispo: Home today after PT/OT

## 2019-01-16 ENCOUNTER — HOME CARE VISIT (OUTPATIENT)
Dept: SCHEDULING | Facility: HOME HEALTH | Age: 44
End: 2019-01-16
Payer: COMMERCIAL

## 2019-01-16 VITALS
TEMPERATURE: 98.3 F | HEART RATE: 90 BPM | SYSTOLIC BLOOD PRESSURE: 110 MMHG | OXYGEN SATURATION: 98 % | DIASTOLIC BLOOD PRESSURE: 60 MMHG | RESPIRATION RATE: 18 BRPM

## 2019-01-16 PROCEDURE — G0151 HHCP-SERV OF PT,EA 15 MIN: HCPCS

## 2019-01-18 ENCOUNTER — HOME CARE VISIT (OUTPATIENT)
Dept: SCHEDULING | Facility: HOME HEALTH | Age: 44
End: 2019-01-18
Payer: COMMERCIAL

## 2019-01-18 PROCEDURE — G0151 HHCP-SERV OF PT,EA 15 MIN: HCPCS

## 2019-01-19 VITALS
OXYGEN SATURATION: 97 % | HEART RATE: 105 BPM | DIASTOLIC BLOOD PRESSURE: 60 MMHG | SYSTOLIC BLOOD PRESSURE: 114 MMHG | RESPIRATION RATE: 18 BRPM | TEMPERATURE: 98.1 F

## 2019-01-21 ENCOUNTER — HOME CARE VISIT (OUTPATIENT)
Dept: SCHEDULING | Facility: HOME HEALTH | Age: 44
End: 2019-01-21
Payer: COMMERCIAL

## 2019-01-21 VITALS
HEART RATE: 76 BPM | DIASTOLIC BLOOD PRESSURE: 70 MMHG | TEMPERATURE: 98 F | OXYGEN SATURATION: 98 % | SYSTOLIC BLOOD PRESSURE: 125 MMHG

## 2019-01-21 VITALS
SYSTOLIC BLOOD PRESSURE: 128 MMHG | RESPIRATION RATE: 18 BRPM | HEART RATE: 125 BPM | TEMPERATURE: 98.2 F | OXYGEN SATURATION: 99 % | DIASTOLIC BLOOD PRESSURE: 72 MMHG

## 2019-01-21 PROCEDURE — G0152 HHCP-SERV OF OT,EA 15 MIN: HCPCS

## 2019-01-21 PROCEDURE — G0151 HHCP-SERV OF PT,EA 15 MIN: HCPCS

## 2019-01-24 ENCOUNTER — HOME CARE VISIT (OUTPATIENT)
Dept: SCHEDULING | Facility: HOME HEALTH | Age: 44
End: 2019-01-24
Payer: COMMERCIAL

## 2019-01-24 VITALS
RESPIRATION RATE: 18 BRPM | SYSTOLIC BLOOD PRESSURE: 120 MMHG | TEMPERATURE: 98.1 F | OXYGEN SATURATION: 99 % | HEART RATE: 102 BPM | DIASTOLIC BLOOD PRESSURE: 66 MMHG

## 2019-01-24 PROCEDURE — G0151 HHCP-SERV OF PT,EA 15 MIN: HCPCS

## 2019-01-28 ENCOUNTER — HOME CARE VISIT (OUTPATIENT)
Dept: SCHEDULING | Facility: HOME HEALTH | Age: 44
End: 2019-01-28
Payer: COMMERCIAL

## 2019-01-28 VITALS
HEART RATE: 90 BPM | DIASTOLIC BLOOD PRESSURE: 60 MMHG | TEMPERATURE: 98 F | OXYGEN SATURATION: 98 % | SYSTOLIC BLOOD PRESSURE: 122 MMHG

## 2019-01-28 PROCEDURE — G0152 HHCP-SERV OF OT,EA 15 MIN: HCPCS

## 2019-01-29 ENCOUNTER — HOME CARE VISIT (OUTPATIENT)
Dept: HOME HEALTH SERVICES | Facility: HOME HEALTH | Age: 44
End: 2019-01-29
Payer: COMMERCIAL

## 2019-01-31 ENCOUNTER — HOME CARE VISIT (OUTPATIENT)
Dept: SCHEDULING | Facility: HOME HEALTH | Age: 44
End: 2019-01-31
Payer: COMMERCIAL

## 2019-01-31 ENCOUNTER — HOME CARE VISIT (OUTPATIENT)
Dept: HOME HEALTH SERVICES | Facility: HOME HEALTH | Age: 44
End: 2019-01-31
Payer: COMMERCIAL

## 2019-01-31 PROCEDURE — G0152 HHCP-SERV OF OT,EA 15 MIN: HCPCS

## 2019-08-06 ENCOUNTER — HOSPITAL ENCOUNTER (OUTPATIENT)
Dept: PHYSICAL THERAPY | Age: 44
Discharge: HOME | End: 2019-08-06
Payer: COMMERCIAL

## 2019-08-06 PROCEDURE — 97110 THERAPEUTIC EXERCISES: CPT | Performed by: PHYSICAL THERAPIST

## 2019-08-06 PROCEDURE — 97161 PT EVAL LOW COMPLEX 20 MIN: CPT | Performed by: PHYSICAL THERAPIST

## 2019-08-06 NOTE — PROGRESS NOTES
[facility] Physical Therapy  [address]  [place], [address]  Phone: [phone]  Fax: [phone]    Plan of Care/Statement of Necessity for Physical Therapy Services  2-15    Patient name: Lisa Salazar  : 1975  Provider#: [id number]  Referral source: Wendy Grant MD      Medical/Treatment Diagnosis: Low back pain [M54.5]     Prior Hospitalization: see medical history     Comorbidities: Long history of scoliosis. Prior Level of Function: Able to forward bend, perform prolonged standing activities without limitation. Medications: Verified on Patient Summary List    Start of Care: 19      Onset Date: 19       The Plan of Care and following information is based on the information from the initial evaluation. Assessment/ key information: This patient presents with decreased ROM and flexibility, decreased LE and core strength, decreased knowledge of proper transfer and functional mobility positions which are most appropriate following fusion, and impaired function. Problem List: pain affecting function, decrease ROM, decrease strength, decrease ADL/ functional abilitiies, decrease activity tolerance, decrease flexibility/ joint mobility and decrease transfer abilities   Treatment Plan may include any combination of the following: Therapeutic exercise, Therapeutic activities, Neuromuscular re-education, Physical agent/modality, Gait/balance training, Manual therapy, Patient education, Self Care training, Functional mobility training and Home safety training  Patient / Family readiness to learn indicated by: asking questions, trying to perform skills and interest  Persons(s) to be included in education: patient (P)  Barriers to Learning/Limitations: None  Patient Goal (s): Berenice Noriegaws my limitations, how to bend, stretch, and understand hip movement.   Patient Self Reported Health Status: excellent  Rehabilitation Potential: good    Short Term Goals:  To be accomplished in 4 weeks: 1. Able to forward bend and touch her knees utilizing hip hinge technique to improve bending ability. 2.  Able to perform a full bridge independently demonstrating improved core strength needed to perform transfers and bed mobility. Long Term Goals: To be accomplished in 8 weeks:  1.  4+/5 hip flexor and abductor strength to tolerate prolonged standing and walking. 2.  Able to forward bend utilizing hip hinge and touch fingertips to mid shin to improve functional bending. 3.  Able to perform standing activities such as meal prep and dishes continuously for at least 25 minutes without increased back pain. Frequency / Duration: Patient to be seen 2 times per week for 8 weeks. Patient/ Caregiver education and instruction: exercises, body mechanics including hip hinge technique, transfers. [x]  Plan of care has been reviewed with LOUIS Herr, PT 8/6/2019   ________________________________________________________________________    I certify that the above Therapy Services are being furnished while the patient is under my care. I agree with the treatment plan and certify that this therapy is necessary.     [de-identified] Signature:____________________  Date:____________Time: _________

## 2019-08-06 NOTE — PROGRESS NOTES
PT INITIAL EVALUATION NOTE 2-15    Patient Name: Rose Banks  Date:2019  : 1975  [x]  Patient  Verified  Payor: Radha Ku / Plan: [address] HMO / Product Type: HMO /    In time: 8:10 am  Out time: 9:10 am  Total Treatment Time (min): 60  Visit #: 1     Treatment Area: Low back pain [M54.5]    SUBJECTIVE  Pain Level (0-10 scale): 0  Any medication changes, allergies to medications, adverse drug reactions, diagnosis change, or new procedure performed?: [] No    [x] Yes (see summary sheet for update)  Subjective:     History of scoliosis, wore a brace in middle school. Did ok for many years. Began noticing more back pain after having her children, especially pushing a shopping cart, bending to get things off a low shelf, lifting any objects. Earlier this year, she took her son in for a scoliosis check up and asked Dr. Dallin Quispe to check her back. After following up with him, x-rays showed her curve had significantly advanced. She had surgery 21, had home health, then has been walking on her own. She followed up with Dr. Dallin Quispe who referred her to therapy. She has pain only when she does things in slightly flexed trunk, such as doing the dishes, cleaning the table, shaving her legs. She has difficulty bending and would like to learn proper technique, as well as, learning exercises to strengthen her legs and core. OBJECTIVE/EXAMINATION  POSTURE:  Scar noted from CT junction to sacrum area. Decreased lumbar lordosis and thoracic kyphosis. AROM: (LUMBAR)  FB:  Severe limitation, has back pain when tries to forward bend more than 15 degrees.   BB:  WFL  SB R:  Limited  SB L:  Severely limited    STRENGTH:   R   L  HIP FLEXORS:     4-   4-  HIP ABDUCTORS:  4-   4-  QUADS:   5   5  HAMS:    5   5  ANKLE DF:   5   5  ANKLE PF:   5   5  ANKLE JACLYN:  5   5  EHL:    5   5    FLEXIBILITY:   R   L  HAMSTRINGS:  Mod   Mod  HIP INT ROTATORS:  Slight   Slight  HIP EXT ROTATORS  Mod   Mod    SPECIAL TESTS:  SLUMP SIGN:  Negative  SLR:  Negative  Core strength:  Was able to bridge about 1 inch off the table due to decreased strength. PALPATION:  Tenderness lumbar paraspinals and multifidus muscles. 30 min Therapeutic Exercise:  [x] See flow sheet :   Rationale: increase ROM and increase strength to improve the patients ability to bend, stand prolonged periods. With   [x] TE   [] TA   [] neuro   [] other: Patient Education: [x] Review HEP    [] Progressed/Changed HEP based on:   [] positioning   [x] body mechanics   [x] transfers   [] heat/ice application    [x] Hip hinge with forward bending and transfers, LE tightness and weakness findings and how it relates to core strength and function.      Other Objective/Functional Measures:    Pain Level (0-10 scale) post treatment: 0    ASSESSMENT:      [x]  See Plan of Chang Aguila V, PT 8/6/2019

## 2019-08-09 ENCOUNTER — HOSPITAL ENCOUNTER (OUTPATIENT)
Dept: PHYSICAL THERAPY | Age: 44
Discharge: HOME | End: 2019-08-09
Payer: COMMERCIAL

## 2019-08-09 PROCEDURE — 97110 THERAPEUTIC EXERCISES: CPT | Performed by: PHYSICAL THERAPIST

## 2019-08-09 NOTE — PROGRESS NOTES
PT DAILY TREATMENT NOTE 2-15    Patient Name: Paula Ponce  Date:2019  : 1975  [x]  Patient  Verified  Payor: Ko Molina / Plan: [address] HMO / Product Type: HMO /    In time: 9:00 am  Out time:10:05 am  Total Treatment Time (min): 72  Visit #:  2    Treatment Area: Low back pain [M54.5]    SUBJECTIVE  Pain Level (0-10 scale): 0  Any medication changes, allergies to medications, adverse drug reactions, diagnosis change, or new procedure performed?: [x] No    [] Yes (see summary sheet for update)  Subjective functional status/changes:   [] No changes reported  \"I was ok when I left, then I got sore. I could tell I used muscles in my legs that I hadn't used. \"    OBJECTIVE    Modality rationale: decrease inflammation and decrease pain to improve the patients ability to bend, stand prolonged periods. Min Type Additional Details       [] Estim: []Att   []Unatt    []TENS instruct                  []IFC  []Premod   []NMES                     []Other:  []w/US   []w/ice   []w/heat  Position:  Location:       []  Traction: [] Cervical       []Lumbar                       [] Prone          []Supine                       []Intermittent   []Continuous Lbs:  [] before manual  [] after manual  []w/heat    []  Ultrasound: []Continuous   [] Pulsed                       at: []1MHz   []3MHz Location:  W/cm2:    [] Paraffin         Location:   []w/heat   10 [x]  Ice     []  Heat  []  Ice massage Position: Hook lying  Location:  Low back    []  Laser  []  Other: Position:  Location:      []  Vasopneumatic Device Pressure:       [] lo [] med [] hi   Temperature:      [x] Skin assessment post-treatment:  [x]intact []redness- no adverse reaction    []redness  adverse reaction:      55 min Therapeutic Exercise:  [x] See flow sheet :  Added recumbent elliptical and core exercises. Rationale: increase ROM and increase strength to improve the patients ability to bend, stand prolonged periods.           With   [x] TE   [] TA   [] neuro   [] other: Patient Education: [x] Review HEP    [] Progressed/Changed HEP based on:   [] positioning   [] body mechanics   [] transfers   [] heat/ice application    [] other:      Other Objective/Functional Measures:      Pain Level (0-10 scale) post treatment: 0    ASSESSMENT/Changes in Function:  Fatigue noted with new exercises. Patient will continue to benefit from skilled PT services to modify and progress therapeutic interventions, address functional mobility deficits, address ROM deficits, address strength deficits, analyze and address soft tissue restrictions, analyze and cue movement patterns, analyze and modify body mechanics/ergonomics and assess and modify postural abnormalities to attain remaining goals.      []  See Plan of Care  []  See progress note/recertification  []  See Discharge Summary         Progress towards goals / Updated goals:  nt    PLAN  [x]  Upgrade activities as tolerated     [x]  Continue plan of care  []  Update interventions per flow sheet       []  Discharge due to:_  []  Other:_      Edmund Najera, PT 8/9/2019

## 2019-08-13 ENCOUNTER — HOSPITAL ENCOUNTER (OUTPATIENT)
Dept: PHYSICAL THERAPY | Age: 44
Discharge: HOME | End: 2019-08-13
Payer: COMMERCIAL

## 2019-08-13 PROCEDURE — 97110 THERAPEUTIC EXERCISES: CPT | Performed by: PHYSICAL THERAPIST

## 2019-08-13 NOTE — PROGRESS NOTES
PT DAILY TREATMENT NOTE 2-15    Patient Name: Bonilla Valentino  Date:2019  : 1975  [x]  Patient  Verified  Payor: Amelie Du / Plan: [employer] HMO / Product Type: HMO /    In time: 8:30 am  Out time:  9:45 am  Total Treatment Time (min): 75  Visit #:  3    Treatment Area: Low back pain [M54.5]    SUBJECTIVE  Pain Level (0-10 scale): 0  Any medication changes, allergies to medications, adverse drug reactions, diagnosis change, or new procedure performed?: [x] No    [] Yes (see summary sheet for update)  Subjective functional status/changes:   [] No changes reported  [place] to [place] last week, returned last night. \"It took us 10 hours so my back is sore. \"    OBJECTIVE    Modality rationale: decrease inflammation and decrease pain to improve the patients ability to bend, stand prolonged periods. Min Type Additional Details       [] Estim: []Att   []Unatt    []TENS instruct                  []IFC  []Premod   []NMES                     []Other:  []w/US   []w/ice   []w/heat  Position:  Location:       []  Traction: [] Cervical       []Lumbar                       [] Prone          []Supine                       []Intermittent   []Continuous Lbs:  [] before manual  [] after manual  []w/heat    []  Ultrasound: []Continuous   [] Pulsed                       at: []1MHz   []3MHz Location:  W/cm2:    [] Paraffin         Location:   []w/heat   10 [x]  Ice     []  Heat  []  Ice massage Position: Hook lying  Location:  Low back    []  Laser  []  Other: Position:  Location:      []  Vasopneumatic Device Pressure:       [] lo [] med [] hi   Temperature:      [x] Skin assessment post-treatment:  [x]intact []redness- no adverse reaction    []redness  adverse reaction:    55 min Therapeutic Exercise:  [x] See flow sheet :  Added partial wall squats with Physio ball. Rationale: increase ROM and increase strength to improve the patients ability to bend, stand prolonged periods.           With   [x] TE   [] TA   [] neuro   [] other: Patient Education: [x] Review HEP    [] Progressed/Changed HEP based on:   [] positioning   [] body mechanics   [] transfers   [] heat/ice application    [] other:      Other Objective/Functional Measures:      Pain Level (0-10 scale) post treatment: 2    ASSESSMENT/Changes in Function:  Increased soreness after exercises today. Patient will continue to benefit from skilled PT services to modify and progress therapeutic interventions, address functional mobility deficits, address ROM deficits, address strength deficits, analyze and address soft tissue restrictions, analyze and cue movement patterns, analyze and modify body mechanics/ergonomics and assess and modify postural abnormalities to attain remaining goals.      []  See Plan of Care  []  See progress note/recertification  []  See Discharge Summary         Progress towards goals / Updated goals:  nt    PLAN  [x]  Upgrade activities as tolerated     [x]  Continue plan of care  []  Update interventions per flow sheet       []  Discharge due to:_  []  Other:_      Trae Connor, PT 8/13/2019

## 2019-08-16 ENCOUNTER — HOSPITAL ENCOUNTER (OUTPATIENT)
Dept: PHYSICAL THERAPY | Age: 44
Discharge: HOME | End: 2019-08-16
Payer: COMMERCIAL

## 2019-08-16 PROCEDURE — 97110 THERAPEUTIC EXERCISES: CPT | Performed by: PHYSICAL THERAPIST

## 2019-08-16 NOTE — PROGRESS NOTES
PT DAILY TREATMENT NOTE 2-15    Patient Name: Duc Brody  Date:2019  : 1975  [x]  Patient  Verified  Payor: Brooke Henning / Plan: [employer] / Product Type: HMO /    In time: 8:50 am  Out time:  9:45 am  Total Treatment Time (min): 55  Visit #:  4    Treatment Area: Low back pain [M54.5]    SUBJECTIVE  Pain Level (0-10 scale): 0  Any medication changes, allergies to medications, adverse drug reactions, diagnosis change, or new procedure performed?: [x] No    [] Yes (see summary sheet for update)  Subjective functional status/changes:   [] No changes reported  \"I was sore last time, but I think it was from the drive. \"  States back feels ok today. OBJECTIVE    Modality rationale: decrease inflammation and decrease pain to improve the patients ability to bend, stand prolonged periods. Min Type Additional Details       [] Estim: []Att   []Unatt    []TENS instruct                  []IFC  []Premod   []NMES                     []Other:  []w/US   []w/ice   []w/heat  Position:  Location:       []  Traction: [] Cervical       []Lumbar                       [] Prone          []Supine                       []Intermittent   []Continuous Lbs:  [] before manual  [] after manual  []w/heat    []  Ultrasound: []Continuous   [] Pulsed                       at: []1MHz   []3MHz Location:  W/cm2:    [] Paraffin         Location:   []w/heat   10 [x]  Ice     []  Heat  []  Ice massage Position: Hook lying  Location:  Low back    []  Laser  []  Other: Position:  Location:      []  Vasopneumatic Device Pressure:       [] lo [] med [] hi   Temperature:      [x] Skin assessment post-treatment:  [x]intact []redness- no adverse reaction    []redness  adverse reaction:    45 min Therapeutic Exercise:  [x] See flow sheet :     Rationale: increase ROM and increase strength to improve the patients ability to bend, stand prolonged periods.           With   [x] TE   [] TA   [] neuro   [] other: Patient Education: [x] Review HEP    [] Progressed/Changed HEP based on:   [] positioning   [] body mechanics   [] transfers   [] heat/ice application    [] other:      Other Objective/Functional Measures:      Pain Level (0-10 scale) post treatment: 1.5    ASSESSMENT/Changes in Function:  Tolerated exercises with mild soreness afterwards. Patient will continue to benefit from skilled PT services to modify and progress therapeutic interventions, address functional mobility deficits, address ROM deficits, address strength deficits, analyze and address soft tissue restrictions, analyze and cue movement patterns, analyze and modify body mechanics/ergonomics and assess and modify postural abnormalities to attain remaining goals.      []  See Plan of Care  []  See progress note/recertification  []  See Discharge Summary         Progress towards goals / Updated goals:  nt    PLAN  [x]  Upgrade activities as tolerated     [x]  Continue plan of care  []  Update interventions per flow sheet       []  Discharge due to:_  []  Other:_      Belia Ricketts, PT 8/16/2019

## 2019-08-20 ENCOUNTER — HOSPITAL ENCOUNTER (OUTPATIENT)
Dept: PHYSICAL THERAPY | Age: 44
Discharge: HOME | End: 2019-08-20
Payer: COMMERCIAL

## 2019-08-20 PROCEDURE — 97110 THERAPEUTIC EXERCISES: CPT | Performed by: PHYSICAL THERAPIST

## 2019-08-23 ENCOUNTER — HOSPITAL ENCOUNTER (OUTPATIENT)
Dept: PHYSICAL THERAPY | Age: 44
Discharge: HOME | End: 2019-08-23
Payer: COMMERCIAL

## 2019-08-23 PROCEDURE — 97110 THERAPEUTIC EXERCISES: CPT | Performed by: PHYSICAL THERAPIST

## 2019-08-23 NOTE — PROGRESS NOTES
PT DAILY TREATMENT NOTE 2-15    Patient Name: Chente Persaud  Date:2019  : 1975  [x]  Patient  Verified  Payor: Campbell Tyson / Plan: [address] HMO / Product Type: HMO /    In time: 9:30 am  Out time:  10:45 am  Total Treatment Time (min): 75  Visit #:  6    Treatment Area: Low back pain [M54.5]    SUBJECTIVE  Pain Level (0-10 scale): 0  Any medication changes, allergies to medications, adverse drug reactions, diagnosis change, or new procedure performed?: [x] No    [] Yes (see summary sheet for update)  Subjective functional status/changes:   [] No changes reported  States continues to do well. No back pain, still having difficulty picking anything up off the floor. OBJECTIVE    Modality rationale: decrease inflammation and decrease pain to improve the patients ability to bend, stand prolonged periods. Min Type Additional Details       [] Estim: []Att   []Unatt    []TENS instruct                  []IFC  []Premod   []NMES                     []Other:  []w/US   []w/ice   []w/heat  Position:  Location:       []  Traction: [] Cervical       []Lumbar                       [] Prone          []Supine                       []Intermittent   []Continuous Lbs:  [] before manual  [] after manual  []w/heat    []  Ultrasound: []Continuous   [] Pulsed                       at: []1MHz   []3MHz Location:  W/cm2:    [] Paraffin         Location:   []w/heat   10 [x]  Ice     []  Heat  []  Ice massage Position: Hook lying  Location:  Low back    []  Laser  []  Other: Position:  Location:      []  Vasopneumatic Device Pressure:       [] lo [] med [] hi   Temperature:      [x] Skin assessment post-treatment:  [x]intact []redness- no adverse reaction    []redness  adverse reaction:    60 min Therapeutic Exercise:  [x] See flow sheet :     Rationale: increase ROM and increase strength to improve the patients ability to bend, stand prolonged periods.           With   [x] TE   [] TA   [] neuro   [] other: Patient Education: [x] Review HEP    [] Progressed/Changed HEP based on:   [] positioning   [] body mechanics   [] transfers   [] heat/ice application    [] other:      Other Objective/Functional Measures:      Pain Level (0-10 scale) post treatment: 0    ASSESSMENT/Changes in Function:  Difficulty with full ROM bridges. Better if maintains slight bridge instead of coming back down to the table. Patient will continue to benefit from skilled PT services to modify and progress therapeutic interventions, address functional mobility deficits, address ROM deficits, address strength deficits, analyze and address soft tissue restrictions, analyze and cue movement patterns, analyze and modify body mechanics/ergonomics and assess and modify postural abnormalities to attain remaining goals.      []  See Plan of Care  []  See progress note/recertification  []  See Discharge Summary         Progress towards goals / Updated goals:  nt    PLAN  [x]  Upgrade activities as tolerated     [x]  Continue plan of care  []  Update interventions per flow sheet       []  Discharge due to:_  []  Other:_      Philomena Reed, PT 8/23/2019

## 2019-08-27 ENCOUNTER — HOSPITAL ENCOUNTER (OUTPATIENT)
Dept: PHYSICAL THERAPY | Age: 44
Discharge: HOME | End: 2019-08-27
Payer: COMMERCIAL

## 2019-08-27 PROCEDURE — 97110 THERAPEUTIC EXERCISES: CPT | Performed by: PHYSICAL THERAPIST

## 2019-08-30 ENCOUNTER — APPOINTMENT (OUTPATIENT)
Dept: PHYSICAL THERAPY | Age: 44
End: 2019-08-30
Payer: COMMERCIAL

## 2019-09-03 ENCOUNTER — HOSPITAL ENCOUNTER (OUTPATIENT)
Dept: PHYSICAL THERAPY | Age: 44
Discharge: HOME | End: 2019-09-03
Payer: COMMERCIAL

## 2019-09-03 PROCEDURE — 97110 THERAPEUTIC EXERCISES: CPT | Performed by: PHYSICAL THERAPY ASSISTANT

## 2019-09-03 NOTE — PROGRESS NOTES
PT DAILY TREATMENT NOTE 2-15    Patient Name: Braden Conn  Date:9/3/2019  : 1975  [x]  Patient  Verified  Payor: Kennedy Gross / Plan: [employer] / Product Type: HMO /    In time: 1:00 PM  Out time:  2:20 Pm  Total Treatment Time (min): 80  Visit #:  8    Treatment Area: Low back pain [M54.5]    SUBJECTIVE  Pain Level (0-10 scale): 1  Any medication changes, allergies to medications, adverse drug reactions, diagnosis change, or new procedure performed?: [x] No    [] Yes (see summary sheet for update)  Subjective functional status/changes:   [] No changes reported  \"It's just that one little spot below my fusion that is hurting. \" States she has difficulty standing at her kitchen counter to cut vegetables due to low back pain. OBJECTIVE    Modality rationale: decrease inflammation and decrease pain to improve the patients ability to bend, stand prolonged periods.    Min Type Additional Details       [] Estim: []Att   []Unatt    []TENS instruct                  []IFC  []Premod   []NMES                     []Other:  []w/US   []w/ice   []w/heat  Position:  Location:       []  Traction: [] Cervical       []Lumbar                       [] Prone          []Supine                       []Intermittent   []Continuous Lbs:  [] before manual  [] after manual  []w/heat    []  Ultrasound: []Continuous   [] Pulsed                       at: []1MHz   []3MHz Location:  W/cm2:    [] Paraffin         Location:   []w/heat   declined [x]  Ice     []  Heat  []  Ice massage Position: Hook lying  Location:  Low back    []  Laser  []  Other: Position:  Location:      []  Vasopneumatic Device Pressure:       [] lo [] med [] hi   Temperature:      [x] Skin assessment post-treatment:  [x]intact []redness- no adverse reaction    []redness  adverse reaction:    80 min Therapeutic Exercise:  [x] See flow sheet :  Added TB shoulder flexion with TrA, lateral step at stairs with TrA, hip hinge using mirror for visual feedback, progressed to green band with TrA with supine clamshell   Rationale: increase ROM and increase strength to improve the patients ability to bend, stand prolonged periods. With   [x] TE   [] TA   [] neuro   [] other: Patient Education: [x] Review HEP    [x] Progressed/Changed HEP based on:  See chart  [] positioning   [] body mechanics   [] transfers   [] heat/ice application    [] other:      Other Objective/Functional Measures:      Pain Level (0-10 scale) post treatment: 0    ASSESSMENT/Changes in Function:  Significant time spent educating patient on appropriate mechanics while squatting and to perform hip hinge. Challenged with progressed core stabilization exercises. Gave patient updated HEP with appropriate bands. Patient will continue to benefit from skilled PT services to modify and progress therapeutic interventions, address functional mobility deficits, address ROM deficits, address strength deficits, analyze and address soft tissue restrictions, analyze and cue movement patterns, analyze and modify body mechanics/ergonomics and assess and modify postural abnormalities to attain remaining goals.      []  See Plan of Care  []  See progress note/recertification  []  See Discharge Summary         Progress towards goals / Updated goals:  nt    PLAN  [x]  Upgrade activities as tolerated     [x]  Continue plan of care  []  Update interventions per flow sheet       []  Discharge due to:_  []  Other:_      Andres Norman, [place] 9/3/2019

## 2019-09-06 ENCOUNTER — HOSPITAL ENCOUNTER (OUTPATIENT)
Dept: PHYSICAL THERAPY | Age: 44
Discharge: HOME | End: 2019-09-06
Payer: COMMERCIAL

## 2019-09-06 PROCEDURE — 97110 THERAPEUTIC EXERCISES: CPT | Performed by: PHYSICAL THERAPIST

## 2019-09-06 NOTE — PROGRESS NOTES
PT DAILY TREATMENT NOTE 2-15    Patient Name: Roxie Bowman  Date:2019  : 1975  [x]  Patient  Verified  Payor: Valentino Alosa / Plan: [employer] / Product Type: HMO /    In time: 9:30 AM  Out time:  10:30 AM  Total Treatment Time (min): 60  Visit #:  9    Treatment Area: Low back pain [M54.5]    SUBJECTIVE  Pain Level (0-10 scale): 0  Any medication changes, allergies to medications, adverse drug reactions, diagnosis change, or new procedure performed?: [x] No    [] Yes (see summary sheet for update)  Subjective functional status/changes:   [] No changes reported  \"My sides were really sore after last time. Maybe from the new exercises. \"  States increased soreness with hip hinge with stick. OBJECTIVE    Modality rationale: decrease inflammation and decrease pain to improve the patients ability to bend, stand prolonged periods. Min Type Additional Details       [] Estim: []Att   []Unatt    []TENS instruct                  []IFC  []Premod   []NMES                     []Other:  []w/US   []w/ice   []w/heat  Position:  Location:       []  Traction: [] Cervical       []Lumbar                       [] Prone          []Supine                       []Intermittent   []Continuous Lbs:  [] before manual  [] after manual  []w/heat    []  Ultrasound: []Continuous   [] Pulsed                       at: []1MHz   []3MHz Location:  W/cm2:    [] Paraffin         Location:   []w/heat   declined [x]  Ice     []  Heat  []  Ice massage Position: Hook lying  Location:  Low back    []  Laser  []  Other: Position:  Location:      []  Vasopneumatic Device Pressure:       [] lo [] med [] hi   Temperature:      [x] Skin assessment post-treatment:  [x]intact []redness- no adverse reaction    []redness  adverse reaction:    60 min Therapeutic Exercise:  [x] See flow sheet :     Rationale: increase ROM and increase strength to improve the patients ability to bend, stand prolonged periods.           With   [x] TE   [] TA [] neuro   [] other: Patient Education: [x] Review HEP    [x] Progressed/Changed HEP based on:  See chart  [] positioning   [] body mechanics   [] transfers   [] heat/ice application    [] other:      Other Objective/Functional Measures:      Pain Level (0-10 scale) post treatment: 0    ASSESSMENT/Changes in Function:  Possible muscle soreness from new Thera Band exercises last session. Patient will continue to benefit from skilled PT services to modify and progress therapeutic interventions, address functional mobility deficits, address ROM deficits, address strength deficits, analyze and address soft tissue restrictions, analyze and cue movement patterns, analyze and modify body mechanics/ergonomics and assess and modify postural abnormalities to attain remaining goals.      []  See Plan of Care  []  See progress note/recertification  []  See Discharge Summary         Progress towards goals / Updated goals:  nt    PLAN  [x]  Upgrade activities as tolerated     [x]  Continue plan of care  []  Update interventions per flow sheet       []  Discharge due to:_  []  Other:_      Steve Wen, PT 9/6/2019

## 2019-09-10 ENCOUNTER — HOSPITAL ENCOUNTER (OUTPATIENT)
Dept: PHYSICAL THERAPY | Age: 44
Discharge: HOME | End: 2019-09-10
Payer: COMMERCIAL

## 2019-09-10 PROCEDURE — 97110 THERAPEUTIC EXERCISES: CPT | Performed by: PHYSICAL THERAPIST

## 2019-09-13 ENCOUNTER — HOSPITAL ENCOUNTER (OUTPATIENT)
Dept: PHYSICAL THERAPY | Age: 44
Discharge: HOME | End: 2019-09-13
Payer: COMMERCIAL

## 2019-09-13 PROCEDURE — 97110 THERAPEUTIC EXERCISES: CPT | Performed by: PHYSICAL THERAPIST

## 2019-09-13 NOTE — PROGRESS NOTES
PT DAILY TREATMENT NOTE 2-15    Patient Name: Mary Diaz  Date:2019  : 1975  [x]  Patient  Verified  Payor: [place] Monique / Plan: [employer] / Product Type: HMO /    In time: 11:30 AM  Out time:  12:40 AM  Total Treatment Time (min): 70  Visit #:  11    Treatment Area: Low back pain [M54.5]    SUBJECTIVE  Pain Level (0-10 scale): <1  Any medication changes, allergies to medications, adverse drug reactions, diagnosis change, or new procedure performed?: [x] No    [] Yes (see summary sheet for update)  Subjective functional status/changes:   [] No changes reported  States continued progress. Sore at times, but overall doing better. OBJECTIVE    Modality rationale: decrease inflammation and decrease pain to improve the patients ability to bend, stand prolonged periods. Min Type Additional Details       [] Estim: []Att   []Unatt    []TENS instruct                  []IFC  []Premod   []NMES                     []Other:  []w/US   []w/ice   []w/heat  Position:  Location:       []  Traction: [] Cervical       []Lumbar                       [] Prone          []Supine                       []Intermittent   []Continuous Lbs:  [] before manual  [] after manual  []w/heat    []  Ultrasound: []Continuous   [] Pulsed                       at: []1MHz   []3MHz Location:  W/cm2:    [] Paraffin         Location:   []w/heat   declined [x]  Ice     []  Heat  []  Ice massage Position: Hook lying  Location:  Low back    []  Laser  []  Other: Position:  Location:      []  Vasopneumatic Device Pressure:       [] lo [] med [] hi   Temperature:      [x] Skin assessment post-treatment:  [x]intact []redness- no adverse reaction    []redness  adverse reaction:    65 min Therapeutic Exercise:  [x] See flow sheet :   Rationale: increase ROM and increase strength to improve the patients ability to bend, stand prolonged periods.           With   [x] TE   [] TA   [] neuro   [] other: Patient Education: [x] Review HEP [x] Progressed/Changed HEP based on:  See chart  [] positioning   [] body mechanics   [] transfers   [] heat/ice application    []      Other Objective/Functional Measures:      Pain Level (0-10 scale) post treatment: <1    ASSESSMENT/Changes in Function:  Patient progressing. Patient will continue to benefit from skilled PT services to modify and progress therapeutic interventions, address functional mobility deficits, address ROM deficits, address strength deficits, analyze and address soft tissue restrictions, analyze and cue movement patterns, analyze and modify body mechanics/ergonomics and assess and modify postural abnormalities to attain remaining goals. []  See Plan of Care  []  See progress note/recertification  []  See Discharge Summary         Progress towards goals / Updated goals:  Short Term Goals:  1. Able to forward bend and touch her knees utilizing hip hinge technique to improve bending ability. Met  2. Able to perform a full bridge independently demonstrating improved core strength needed to perform transfers and bed mobility. Met     PLAN  [x]  Upgrade activities as tolerated     [x]  Continue plan of care  []  Update interventions per flow sheet       []  Discharge due to:_  [x]  Progress hip strength and core stabilization.      Casey Guillaume V, PT 9/13/2019

## 2019-09-18 ENCOUNTER — HOSPITAL ENCOUNTER (OUTPATIENT)
Dept: PHYSICAL THERAPY | Age: 44
Discharge: HOME | End: 2019-09-18
Payer: COMMERCIAL

## 2019-09-18 PROCEDURE — 97110 THERAPEUTIC EXERCISES: CPT | Performed by: PHYSICAL THERAPIST

## 2019-09-18 NOTE — PROGRESS NOTES
PT DAILY TREATMENT NOTE 2-15    Patient Name: Monet Lauren  Date:2019  : 1975  [x]  Patient  Verified  Payor: Sanjay Locus / Plan: [address] HMO / Product Type: HMO /    In time: 11:00 AM  Out time:  12:15 AM  Total Treatment Time (min): 75  Visit #:  12    Treatment Area: Low back pain [M54.5]    SUBJECTIVE  Pain Level (0-10 scale): 2  Any medication changes, allergies to medications, adverse drug reactions, diagnosis change, or new procedure performed?: [x] No    [] Yes (see summary sheet for update)  Subjective functional status/changes:   [] No changes reported  Complains of scapular muscular discomfort when does bicep curls and shoulder PRE's. OBJECTIVE  Right rhomboid and thoracic paraspinal activity noted with bicep curls and shoulder PRE's. Modality rationale: decrease inflammation and decrease pain to improve the patients ability to bend, stand prolonged periods.    Min Type Additional Details       [] Estim: []Att   []Unatt    []TENS instruct                  []IFC  []Premod   []NMES                     []Other:  []w/US   []w/ice   []w/heat  Position:  Location:       []  Traction: [] Cervical       []Lumbar                       [] Prone          []Supine                       []Intermittent   []Continuous Lbs:  [] before manual  [] after manual  []w/heat    []  Ultrasound: []Continuous   [] Pulsed                       at: []1MHz   []3MHz Location:  W/cm2:    [] Paraffin         Location:   []w/heat   declined [x]  Ice     []  Heat  []  Ice massage Position: Hook lying  Location:  Low back    []  Laser  []  Other: Position:  Location:      []  Vasopneumatic Device Pressure:       [] lo [] med [] hi   Temperature:      [x] Skin assessment post-treatment:  [x]intact []redness- no adverse reaction    []redness  adverse reaction:    70 min Therapeutic Exercise:  [x] See flow sheet :  Added 3/4 shrugs with weight   Rationale: increase ROM and increase strength to improve the patients ability to bend, stand prolonged periods. With   [x] TE   [] TA   [] neuro   [] other: Patient Education: [x] Review HEP    [x] Progressed/Changed HEP based on:  See chart  [] positioning   [] body mechanics   [] transfers   [] heat/ice application    []      Other Objective/Functional Measures:      Pain Level (0-10 scale) post treatment: 1    ASSESSMENT/Changes in Function:  Increased scapular muscle activity may be due to trying to keep spine stable with UE movement. Patient will continue to benefit from skilled PT services to modify and progress therapeutic interventions, address functional mobility deficits, address ROM deficits, address strength deficits, analyze and address soft tissue restrictions, analyze and cue movement patterns, analyze and modify body mechanics/ergonomics and assess and modify postural abnormalities to attain remaining goals. []  See Plan of Care  []  See progress note/recertification  []  See Discharge Summary         Progress towards goals / Updated goals:  Short Term Goals:  1. Able to forward bend and touch her knees utilizing hip hinge technique to improve bending ability. Met  2. Able to perform a full bridge independently demonstrating improved core strength needed to perform transfers and bed mobility. Met     PLAN  [x]  Upgrade activities as tolerated     [x]  Continue plan of care  []  Update interventions per flow sheet       []  Discharge due to:_  [x]  Progress hip strength and core stabilization.      Ventura Kawasaki V, PT 9/18/2019

## 2019-09-24 ENCOUNTER — HOSPITAL ENCOUNTER (OUTPATIENT)
Dept: PHYSICAL THERAPY | Age: 44
Discharge: HOME | End: 2019-09-24
Payer: COMMERCIAL

## 2019-09-24 PROCEDURE — 97110 THERAPEUTIC EXERCISES: CPT | Performed by: PHYSICAL THERAPIST

## 2019-09-24 NOTE — PROGRESS NOTES
PT DAILY TREATMENT NOTE 2-15    Patient Name: Dorothy Guy  Date:2019  : 1975  [x]  Patient  Verified  Payor: Judith Waggoner / Plan: [employer] / Product Type: HMO /    In time: 9:00 AM  Out time:  10:10  AM  Total Treatment Time (min): 70  Visit #:  13    Treatment Area: Low back pain [M54.5]    SUBJECTIVE  Pain Level (0-10 scale): 1-2  Any medication changes, allergies to medications, adverse drug reactions, diagnosis change, or new procedure performed?: [x] No    [] Yes (see summary sheet for update)  Subjective functional status/changes:   [] No changes reported  \"the bridges are easier. I feel less movement when I do it now. \"    OBJECTIVE  Right rhomboid and thoracic paraspinal activity noted with bicep curls and shoulder PRE's. Modality rationale: decrease inflammation and decrease pain to improve the patients ability to bend, stand prolonged periods. Min Type Additional Details       [] Estim: []Att   []Unatt    []TENS instruct                  []IFC  []Premod   []NMES                     []Other:  []w/US   []w/ice   []w/heat  Position:  Location:       []  Traction: [] Cervical       []Lumbar                       [] Prone          []Supine                       []Intermittent   []Continuous Lbs:  [] before manual  [] after manual  []w/heat    []  Ultrasound: []Continuous   [] Pulsed                       at: []1MHz   []3MHz Location:  W/cm2:    [] Paraffin         Location:   []w/heat   declined [x]  Ice     []  Heat  []  Ice massage Position: Hook lying  Location:  Low back    []  Laser  []  Other: Position:  Location:      []  Vasopneumatic Device Pressure:       [] lo [] med [] hi   Temperature:      [x] Skin assessment post-treatment:  [x]intact []redness- no adverse reaction    []redness  adverse reaction:    70 min Therapeutic Exercise:  [x] See flow sheet :    Added forward and backward UBE limiting rotation at trunk.    Rationale: increase ROM and increase strength to improve the patients ability to bend, stand prolonged periods. With   [x] TE   [] TA   [] neuro   [] other: Patient Education: [x] Review HEP    [x] Progressed/Changed HEP based on:  See chart  [] positioning   [] body mechanics   [] transfers   [] heat/ice application    []      Other Objective/Functional Measures:      Pain Level (0-10 scale) post treatment: 1    ASSESSMENT/Changes in Function:  Improved lumbo-pelvic stability noted with exercises. Patient will continue to benefit from skilled PT services to modify and progress therapeutic interventions, address functional mobility deficits, address ROM deficits, address strength deficits, analyze and address soft tissue restrictions, analyze and cue movement patterns, analyze and modify body mechanics/ergonomics and assess and modify postural abnormalities to attain remaining goals. []  See Plan of Care  []  See progress note/recertification  []  See Discharge Summary         Progress towards goals / Updated goals:  Short Term Goals:  1. Able to forward bend and touch her knees utilizing hip hinge technique to improve bending ability. Met  2. Able to perform a full bridge independently demonstrating improved core strength needed to perform transfers and bed mobility. Met     PLAN  [x]  Upgrade activities as tolerated     [x]  Continue plan of care  []  Update interventions per flow sheet       []  Discharge due to:_  [x]  Progress hip strength and core stabilization.      Yoel Willosn V, PT 9/24/2019

## 2019-10-02 ENCOUNTER — HOSPITAL ENCOUNTER (OUTPATIENT)
Dept: PHYSICAL THERAPY | Age: 44
Discharge: HOME | End: 2019-10-02
Payer: COMMERCIAL

## 2019-10-02 PROCEDURE — 97110 THERAPEUTIC EXERCISES: CPT | Performed by: PHYSICAL THERAPIST

## 2019-10-02 NOTE — PROGRESS NOTES
PT DAILY TREATMENT NOTE 2-15    Patient Name: Franko Kelly  Date:10/2/2019  : 1975  [x]  Patient  Verified  Payor: [facility] / Plan: [employer] HMO / Product Type: HMO /    In time: 10:00 AM  Out time:  11:10  AM  Total Treatment Time (min): 70  Visit #:  14    Treatment Area: Low back pain [M54.5]    SUBJECTIVE  Pain Level (0-10 scale): 2-3  Any medication changes, allergies to medications, adverse drug reactions, diagnosis change, or new procedure performed?: [x] No    [] Yes (see summary sheet for update)  Subjective functional status/changes:   [] No changes reported  \"I worked a full day on Friday, then went to a party. I was tired, but it went ok. \"    OBJECTIVE  Able to perform supine SLR but fatigues out. Able to perform a few B SLR's but only about 4 inches  Able to partial squat and hip hinge fingertips to knee caps. Modality rationale: decrease inflammation and decrease pain to improve the patients ability to bend, stand prolonged periods.    Min Type Additional Details       [] Estim: []Att   []Unatt    []TENS instruct                  []IFC  []Premod   []NMES                     []Other:  []w/US   []w/ice   []w/heat  Position:  Location:       []  Traction: [] Cervical       []Lumbar                       [] Prone          []Supine                       []Intermittent   []Continuous Lbs:  [] before manual  [] after manual  []w/heat    []  Ultrasound: []Continuous   [] Pulsed                       at: []1MHz   []3MHz Location:  W/cm2:    [] Paraffin         Location:   []w/heat    [x]  Ice     []  Heat  []  Ice massage Position: Hook lying  Location:  Low back    []  Laser  []  Other: Position:  Location:      []  Vasopneumatic Device Pressure:       [] lo [] med [] hi   Temperature:      [x] Skin assessment post-treatment:  [x]intact []redness- no adverse reaction    []redness  adverse reaction:    65 min Therapeutic Exercise:  [x] See flow sheet :    Added forward and backward UBE limiting rotation at trunk. Rationale: increase ROM and increase strength to improve the patients ability to bend, stand prolonged periods. With   [x] TE   [] TA   [] neuro   [] other: Patient Education: [x] Review HEP    [x] Progressed/Changed HEP based on:  See chart  [] positioning   [] body mechanics   [] transfers   [] heat/ice application    []      Other Objective/Functional Measures:      Pain Level (0-10 scale) post treatment: 2    ASSESSMENT/Changes in Function:  Improved abdominal/pelvic stability noted with SLR's. Still has difficulty with hip hinge to forward bend. Patient will continue to benefit from skilled PT services to modify and progress therapeutic interventions, address functional mobility deficits, address ROM deficits, address strength deficits, analyze and address soft tissue restrictions, analyze and cue movement patterns, analyze and modify body mechanics/ergonomics and assess and modify postural abnormalities to attain remaining goals. []  See Plan of Care  []  See progress note/recertification  []  See Discharge Summary         Progress towards goals / Updated goals:  Short Term Goals:  1. Able to forward bend and touch her knees utilizing hip hinge technique to improve bending ability. Met  2. Able to perform a full bridge independently demonstrating improved core strength needed to perform transfers and bed mobility. Met     PLAN  [x]  Upgrade activities as tolerated     [x]  Continue plan of care  []  Update interventions per flow sheet       []  Discharge due to:_  [x]  Progress hip strength and core stabilization.      Lori Olvera V, PT 10/2/2019

## 2019-10-08 ENCOUNTER — HOSPITAL ENCOUNTER (OUTPATIENT)
Dept: PHYSICAL THERAPY | Age: 44
Discharge: HOME | End: 2019-10-08
Payer: COMMERCIAL

## 2019-10-08 PROCEDURE — 97110 THERAPEUTIC EXERCISES: CPT | Performed by: PHYSICAL THERAPIST

## 2019-10-08 NOTE — PROGRESS NOTES
PT DAILY TREATMENT NOTE 2-15    Patient Name: Maryellen Cross  Date:10/8/2019  : 1975  [x]  Patient  Verified  Payor: John Castro / Plan: [employer] HMO / Product Type: HMO /    In time: 1:00 PM  Out time:  2:05 PM  Total Treatment Time (min): 72  Visit #:  15    Treatment Area: Low back pain [M54.5]    SUBJECTIVE  Pain Level (0-10 scale): 1-2  Any medication changes, allergies to medications, adverse drug reactions, diagnosis change, or new procedure performed?: [x] No    [] Yes (see summary sheet for update)  Subjective functional status/changes:     \"I worked yesterday. I was wore out by the end of the day. I rested and today it's better. \"    OBJECTIVE    Modality rationale: decrease inflammation and decrease pain to improve the patients ability to bend, stand prolonged periods. Min Type Additional Details       [] Estim: []Att   []Unatt    []TENS instruct                  []IFC  []Premod   []NMES                     []Other:  []w/US   []w/ice   []w/heat  Position:  Location:       []  Traction: [] Cervical       []Lumbar                       [] Prone          []Supine                       []Intermittent   []Continuous Lbs:  [] before manual  [] after manual  []w/heat    []  Ultrasound: []Continuous   [] Pulsed                       at: []1MHz   []3MHz Location:  W/cm2:    [] Paraffin         Location:   []w/heat    [x]  Ice     []  Heat  []  Ice massage Position: Hook lying  Location:  Low back    []  Laser  []  Other: Position:  Location:      []  Vasopneumatic Device Pressure:       [] lo [] med [] hi   Temperature:      [x] Skin assessment post-treatment:  [x]intact []redness- no adverse reaction    []redness  adverse reaction:    65 min Therapeutic Exercise:  [x] See flow sheet :       Rationale: increase ROM and increase strength to improve the patients ability to bend, stand prolonged periods.           With   [x] TE   [] TA   [] neuro   [] other: Patient Education: [x] Review HEP    [x] Progressed/Changed HEP based on:  See chart  [] positioning   [] body mechanics   [] transfers   [] heat/ice application    []      Other Objective/Functional Measures:      Pain Level (0-10 scale) post treatment: 1    ASSESSMENT/Changes in Function:  Patient progressing, improved functional tolerance. Patient will continue to benefit from skilled PT services to modify and progress therapeutic interventions, address functional mobility deficits, address ROM deficits, address strength deficits, analyze and address soft tissue restrictions, analyze and cue movement patterns, analyze and modify body mechanics/ergonomics and assess and modify postural abnormalities to attain remaining goals. []  See Plan of Care  []  See progress note/recertification  []  See Discharge Summary         Progress towards goals / Updated goals:  Short Term Goals:  1. Able to forward bend and touch her knees utilizing hip hinge technique to improve bending ability. Met  2. Able to perform a full bridge independently demonstrating improved core strength needed to perform transfers and bed mobility. Met     PLAN  [x]  Upgrade activities as tolerated     [x]  Continue plan of care  []  Update interventions per flow sheet       []  Discharge due to:_  [x]  Progress hip strength and core stabilization.      Lazara Long V, PT 10/8/2019

## 2019-10-15 ENCOUNTER — HOSPITAL ENCOUNTER (OUTPATIENT)
Dept: PHYSICAL THERAPY | Age: 44
Discharge: HOME | End: 2019-10-15
Payer: COMMERCIAL

## 2019-10-15 PROCEDURE — 97110 THERAPEUTIC EXERCISES: CPT | Performed by: PHYSICAL THERAPIST

## 2019-10-15 PROCEDURE — 97140 MANUAL THERAPY 1/> REGIONS: CPT | Performed by: PHYSICAL THERAPIST

## 2019-10-16 NOTE — PROGRESS NOTES
PT DAILY TREATMENT NOTE 2-15    Patient Name: Ples Crigler  Date:10/15/2019  : 1975  [x]  Patient  Verified  Payor: [employer] / Plan: [employer] HMO / Product Type: HMO /    In time: 10:00 AM  Out time:  11:15 AM  Total Treatment Time (min): 75  Visit #:  16    Treatment Area: Low back pain [M54.5]    SUBJECTIVE  Pain Level (0-10 scale): 1-2  Any medication changes, allergies to medications, adverse drug reactions, diagnosis change, or new procedure performed?: [x] No    [] Yes (see summary sheet for update)  Subjective functional status/changes: \"We toured [employer] yesterday. I did a lot of walking and riding in the car. It's sore on the right side. \"    OBJECTIVE  Right ASIS low (possible right anterior innominate)  Tenderness right SI joint    Modality rationale: decrease inflammation and decrease pain to improve the patients ability to bend, stand prolonged periods.    Min Type Additional Details       [] Estim: []Att   []Unatt    []TENS instruct                  []IFC  []Premod   []NMES                     []Other:  []w/US   []w/ice   []w/heat  Position:  Location:       []  Traction: [] Cervical       []Lumbar                       [] Prone          []Supine                       []Intermittent   []Continuous Lbs:  [] before manual  [] after manual  []w/heat    []  Ultrasound: []Continuous   [] Pulsed                       at: []1MHz   []3MHz Location:  W/cm2:    [] Paraffin         Location:   []w/heat   10 [x]  Ice     []  Heat  []  Ice massage Position: Hook lying  Location:  Low back    []  Laser  []  Other: Position:  Location:      []  Vasopneumatic Device Pressure:       [] lo [] med [] hi   Temperature:      [x] Skin assessment post-treatment:  [x]intact []redness- no adverse reaction    []redness  adverse reaction:    55 min Therapeutic Exercise:  [x] See flow sheet :     Rationale: increase ROM and increase strength to improve the patients ability to bend, stand prolonged periods. 10 min Manual Therapy:   MET with right hamstring for possible right anterior innominate   Rationale: decrease pain, increase ROM and increase tissue extensibility to improve the patients ability to bend, stand prolonged periods. With   [x] TE   [] TA   [] neuro   [] other: Patient Education: [x] Review HEP    [x] Progressed/Changed HEP based on:  See chart  [] positioning   [] body mechanics   [] transfers   [] heat/ice application    []      Other Objective/Functional Measures:      Pain Level (0-10 scale) post treatment: 0    ASSESSMENT/Changes in Function:  Decreased back pain with treatment. Patient will continue to benefit from skilled PT services to modify and progress therapeutic interventions, address functional mobility deficits, address ROM deficits, address strength deficits, analyze and address soft tissue restrictions, analyze and cue movement patterns, analyze and modify body mechanics/ergonomics and assess and modify postural abnormalities to attain remaining goals. []  See Plan of Care  []  See progress note/recertification  []  See Discharge Summary         Progress towards goals / Updated goals:  Short Term Goals:  1. Able to forward bend and touch her knees utilizing hip hinge technique to improve bending ability. Met  2. Able to perform a full bridge independently demonstrating improved core strength needed to perform transfers and bed mobility. Met     PLAN  [x]  Upgrade activities as tolerated     [x]  Continue plan of care  []  Update interventions per flow sheet       []  Discharge due to:_  [x]  Progress hip strength and core stabilization.      Tim Amato V, PT 10/15/2019

## 2019-10-22 ENCOUNTER — HOSPITAL ENCOUNTER (OUTPATIENT)
Dept: PHYSICAL THERAPY | Age: 44
Discharge: HOME | End: 2019-10-22
Payer: COMMERCIAL

## 2019-10-22 PROCEDURE — 97110 THERAPEUTIC EXERCISES: CPT | Performed by: PHYSICAL THERAPIST

## 2019-10-22 NOTE — PROGRESS NOTES
PT PROGRESS NOTE 2-15    Patient Name: Umm Kent  Date:10/22/2019  : 1975  [x]  Patient  Verified  Payor: Namanmarry Porter / Plan: [employer] HMO / Product Type: HMO /    In time: 10:00 AM  Out time:  11:15 AM  Total Treatment Time (min): 75  Visit #:  17    Treatment Area: Low back pain [M54.5]    SUBJECTIVE  Pain Level (0-10 scale): 1-2  Any medication changes, allergies to medications, adverse drug reactions, diagnosis change, or new procedure performed?: [x] No    [] Yes (see summary sheet for update)  Subjective functional status/changes: \"It took me a few days, but then it felt better. It was really stiff after standing at the football game last week. \"  States uses modified squatting technique if needs to get anything off of floor. OBJECTIVE  ASIS = today  Tenderness right SI joint    AROM: (LUMBAR)  FB:  Fingertips to knees. STRENGTH:   R   L  HIP FLEXORS:     4+   4+  HIP ABDUCTORS:  4   4+      Modality rationale: decrease inflammation and decrease pain to improve the patients ability to bend, stand prolonged periods.    Min Type Additional Details       [] Estim: []Att   []Unatt    []TENS instruct                  []IFC  []Premod   []NMES                     []Other:  []w/US   []w/ice   []w/heat  Position:  Location:       []  Traction: [] Cervical       []Lumbar                       [] Prone          []Supine                       []Intermittent   []Continuous Lbs:  [] before manual  [] after manual  []w/heat    []  Ultrasound: []Continuous   [] Pulsed                       at: []1MHz   []3MHz Location:  W/cm2:    [] Paraffin         Location:   []w/heat   10 [x]  Ice     []  Heat  []  Ice massage Position: Hook lying  Location:  Low back    []  Laser  []  Other: Position:  Location:      []  Vasopneumatic Device Pressure:       [] lo [] med [] hi   Temperature:      [x] Skin assessment post-treatment:  [x]intact []redness- no adverse reaction    []redness  adverse reaction:    60 min Therapeutic Exercise:  [x] See flow sheet :     Rationale: increase ROM and increase strength to improve the patients ability to bend, stand prolonged periods. min Manual Therapy:    Rationale: decrease pain, increase ROM and increase tissue extensibility to improve the patients ability to bend, stand prolonged periods. With   [x] TE   [] TA   [] neuro   [] other: Patient Education: [x] Review HEP    [x] Progressed/Changed HEP based on:  See chart  [] positioning   [] body mechanics   [] transfers   [] heat/ice application    []      Other Objective/Functional Measures:      Pain Level (0-10 scale) post treatment: 0    ASSESSMENT/Changes in Function:  Patient has made good progress since beginning physical therapy. Still lacks hip hinge ability, but able to perform modifies squat for bending activities. Progress towards goals / Updated goals:  Short Term Goals:  1. Able to forward bend and touch her knees utilizing hip hinge technique to improve bending ability. Met  2. Able to perform a full bridge independently demonstrating improved core strength needed to perform transfers and bed mobility. Met     Long Term Goals:   1.  4+/5 hip flexor and abductor strength to tolerate prolonged standing and walking. Hip flexors and left hip abductor met, right hip abductor not met. 2.  Able to forward bend utilizing hip hinge and touch fingertips to mid shin to improve functional bending. Not met. 3.  Able to perform standing activities such as meal prep and dishes continuously for at least 25 minutes without increased back pain. Met    PLAN  []  Discharge due to:  Goals met/progressing towards.     Ashley Young V, PT 10/22/2019

## 2019-12-22 NOTE — ANCILLARY DISCHARGE INSTRUCTIONS
PT DISCHARGE NOTE 2-15 Patient Name: Sadie Hennessy 
Date:2019 : 1975 [x]  Patient  Verified Payor: [employer]d Mage / Plan: [address] HMO / Product Type: HMO /   
 
Treatment Area: Low back pain [M54.5] SUBJECTIVE Pain Level (0-10 scale): 1 Subjective functional status/changes: \"It took me a few days, but then it felt better. It was really stiff after standing at the football game last week. \"  States uses modified squatting technique if needs to get anything off of floor. OBJECTIVE 
ASIS = today Tenderness right SI joint AROM: (LUMBAR) FB:  Fingertips to knees (hip hinge technique) STRENGTH:   R   L 
HIP FLEXORS:     4+   4+ HIP ABDUCTORS:  4   4+ With 
 [x] TE 
 [] TA 
 [] neuro 
 [] other: Patient Education: [x] Review HEP [x] Progressed/Changed HEP based on:  See chart 
[] positioning   [] body mechanics   [] transfers   [] heat/ice application   
[] Other Objective/Functional Measures:   
 
Pain Level (0-10 scale) post treatment: 0 
 
ASSESSMENT/Changes in Function:  Patient has made good progress since beginning physical therapy. Still lacks hip hinge ability, but able to perform modifies squat for bending activities. Progress towards goals / Updated goals: 
Short Term Goals: 1. Able to forward bend and touch her knees utilizing hip hinge technique to improve bending ability. Met 2. Able to perform a full bridge independently demonstrating improved core strength needed to perform transfers and bed mobility. Met 
  
Long Term Goals: 1.  4+/5 hip flexor and abductor strength to tolerate prolonged standing and walking. Hip flexors and left hip abductor met, right hip abductor not met. 2.  Able to forward bend utilizing hip hinge and touch fingertips to mid shin to improve functional bending. Not met.  
3.  Able to perform standing activities such as meal prep and dishes continuously for at least 25 minutes without increased back pain. Met PLAN 
[]  Discharge due to:  Goals met/progressing towards. [place] V, PT 12/22/2019

## 2021-01-26 ENCOUNTER — TRANSCRIBE ORDER (OUTPATIENT)
Dept: SCHEDULING | Age: 46
End: 2021-01-26

## 2021-01-26 DIAGNOSIS — Z98.1 H/O SPINAL FUSION: Primary | ICD-10-CM

## 2022-02-22 ENCOUNTER — OFFICE VISIT (OUTPATIENT)
Dept: ORTHOPEDIC SURGERY | Age: 47
End: 2022-02-22
Payer: COMMERCIAL

## 2022-02-22 VITALS — HEIGHT: 66 IN | WEIGHT: 190 LBS | BODY MASS INDEX: 30.53 KG/M2

## 2022-02-22 DIAGNOSIS — Z98.890 H/O SPINAL SURGERY: Primary | ICD-10-CM

## 2022-02-22 PROCEDURE — 99213 OFFICE O/P EST LOW 20 MIN: CPT | Performed by: ORTHOPAEDIC SURGERY

## 2022-02-22 NOTE — PROGRESS NOTES
Jasmina Limon (: 1975) is a 42 y.o. female patient, here for evaluation of the following chief complaint(s):  Scoliosis (follow up)       ASSESSMENT/PLAN:  Below is the assessment and plan developed based on review of pertinent history, physical exam, labs, studies, and medications. Idiopathic scoliosis posterior spinal fusion 3 years ago some vague shoulder pain at the end of the day no numbness no tingling no dysesthesias I feel this is parascapular discomfort and muscular weakness put her on shoulder shrugs were to enhance her vitamin D we talked about the sitting position sitting hygiene computer visualization if she is not responding she is to let me know no evidence of post junctional kyphosis      1. H/O Spinal surgery  -     XR SPINE ENTIRE T-L , SKULL TO SACRUM 2 OR 3 VWS SCOLIOSIS; Future      No follow-ups on file. SUBJECTIVE/OBJECTIVE:  Jasmina Limon (: 1975) is a 42 y.o. female who presents today for the following:  Chief Complaint   Patient presents with    Scoliosis     follow up       3 years status post posterior spinal fusion for scoliosis doing well no numbness no tingling no dysesthesias some vague shoulder parascapular discomfort    IMAGING:  PA and lateral scoliosis views well-seated hardware satisfactory alignment no loosening no migration no cut out no post junctional kyphosis    No Known Allergies    Current Outpatient Medications   Medication Sig    acetaminophen (TYLENOL) 500 mg tablet Take 500 mg by mouth every six (6) hours as needed for Fever or Pain. (Patient not taking: Reported on 2022)    senna-docusate (PERICOLACE) 8.6-50 mg per tablet Take 1 Tab by mouth two (2) times a day. (Patient not taking: Reported on 2022)     No current facility-administered medications for this visit.        Past Medical History:   Diagnosis Date    Adverse effect of anesthesia     WAS VERY AGGRESSIVE WHEN WAKING UP    Hemorrhoids     Scoliosis         Past Surgical History:   Procedure Laterality Date    HX DILATION AND CURETTAGE  2005    HX GYN  2008    EXPLORATORY-PERF. UTERUS DUE TO IUD       Family History   Problem Relation Age of Onset    Thyroid Disease Mother     High Cholesterol Father     No Known Problems Sister     Seizures Brother     Cancer Maternal Grandmother         BREAST    Heart Disease Maternal Grandfather     High Cholesterol Maternal Grandfather     Hypertension Paternal Grandmother     Heart Disease Paternal Grandfather         Social History     Tobacco Use    Smoking status: Never Smoker    Smokeless tobacco: Never Used   Substance Use Topics    Alcohol use: Not on file     Comment: RARELY        Review of Systems     No flowsheet data found. Vitals:  Ht 5' 6\" (1.676 m)   Wt 190 lb (86.2 kg)   BMI 30.67 kg/m²    Body mass index is 30.67 kg/m². Physical Exam    Pleasant young lady well-groomed there is normal appearance of the cervical spine. There is no cervical lymphadenopathy. There appears to be no scoliosis or kyphosis present. There is no tenderness along the spinous process. The spinous process of the cervical spine and the upper thoracic spine were palpated and there is also no tenderness along the paracervical muscles. Range of motion demonstrates full and painless flexion, extension, rotation and lateral bending. The patient is able to touch chin to chest, extend to 90 degrees, laterally rotate 80 degrees and laterally bend 45 degrees. There is 5/5 muscle strength in all muscle groups tested. Reflexes are +2 and symmetric. There are no findings of spasticity. Garcia's sign is negative. Sensory exam is normal to light touch and proprioception.   She has discomfort with shoulder shrugs and especially when we had her do scapular retraction type exercises she has full scapulothoracic motion negative straight leg raise no clonus wound looks good no kyphosis no hardware prominence      An electronic signature was used to authenticate this note.   -- Eliza Roberson MD

## 2022-03-18 PROBLEM — R00.0 SINUS TACHYCARDIA: Status: ACTIVE | Noted: 2019-01-11

## 2022-03-19 PROBLEM — R82.79 URINE CULTURE POSITIVE: Status: ACTIVE | Noted: 2019-01-02

## 2023-05-11 RX ORDER — ACETAMINOPHEN 500 MG
TABLET ORAL EVERY 6 HOURS PRN
COMMUNITY

## 2023-05-11 RX ORDER — AMOXICILLIN 250 MG
1 CAPSULE ORAL 2 TIMES DAILY
COMMUNITY
Start: 2019-01-15

## 2024-10-23 NOTE — PROGRESS NOTES
PT PROGRESS NOTE 2-15    Patient Name: Allison Marks  Date:9/10/2019  : 1975  [x]  Patient  Verified  Payor: [facility] / Plan: [employer] / Product Type: HMO /    In time: 10:00 AM  Out time:  11:00 AM  Total Treatment Time (min): 60  Visit #:  10    Treatment Area: Low back pain [M54.5]    SUBJECTIVE  Pain Level (0-10 scale): 0  Any medication changes, allergies to medications, adverse drug reactions, diagnosis change, or new procedure performed?: [x] No    [] Yes (see summary sheet for update)  Subjective functional status/changes:   [] No changes reported  \"I stood and walked for several hours with the band on Friday. I didn't hurt at the end of the night. I wouldn't have been able to do that before. \"    OBJECTIVE  Forward Bending:  Uses squatting technique and slight hip hinge at the end to retrieve objects from the floor. STRENGTH:                           R                                  L  HIP FLEXORS:                       4                                4  HIP ABDUCTORS:                 4                                  4     Bridging:  Able to perform with full ROM. Modality rationale: decrease inflammation and decrease pain to improve the patients ability to bend, stand prolonged periods.    Min Type Additional Details       [] Estim: []Att   []Unatt    []TENS instruct                  []IFC  []Premod   []NMES                     []Other:  []w/US   []w/ice   []w/heat  Position:  Location:       []  Traction: [] Cervical       []Lumbar                       [] Prone          []Supine                       []Intermittent   []Continuous Lbs:  [] before manual  [] after manual  []w/heat    []  Ultrasound: []Continuous   [] Pulsed                       at: []1MHz   []3MHz Location:  W/cm2:    [] Paraffin         Location:   []w/heat   declined [x]  Ice     []  Heat  []  Ice massage Position: Hook lying  Location:  Low back    []  Laser  []  Other: Position:  Location:      [] Vasopneumatic Device Pressure:       [] lo [] med [] hi   Temperature:      [x] Skin assessment post-treatment:  [x]intact []redness- no adverse reaction    []redness  adverse reaction:    60 min Therapeutic Exercise:  [x] See flow sheet :     Rationale: increase ROM and increase strength to improve the patients ability to bend, stand prolonged periods. With   [x] TE   [] TA   [] neuro   [] other: Patient Education: [x] Review HEP    [x] Progressed/Changed HEP based on:  See chart  [] positioning   [] body mechanics   [] transfers   [] heat/ice application    [x] Instructed in proper body mechanics for wiping table using forward and side lunge instead of using partial squat with trunk side to side and rotational movement to decrease stress on spine. Other Objective/Functional Measures:      Pain Level (0-10 scale) post treatment: 1    ASSESSMENT/Changes in Function:  Patient progressing well. Increased hip strength, improved standing and walking tolerance noted. Patient will continue to benefit from skilled PT services to modify and progress therapeutic interventions, address functional mobility deficits, address ROM deficits, address strength deficits, analyze and address soft tissue restrictions, analyze and cue movement patterns, analyze and modify body mechanics/ergonomics and assess and modify postural abnormalities to attain remaining goals. []  See Plan of Care  []  See progress note/recertification  []  See Discharge Summary         Progress towards goals / Updated goals:  Short Term Goals:  1. Able to forward bend and touch her knees utilizing hip hinge technique to improve bending ability. Met  2. Able to perform a full bridge independently demonstrating improved core strength needed to perform transfers and bed mobility.   Met     PLAN  [x]  Upgrade activities as tolerated     [x]  Continue plan of care  []  Update interventions per flow sheet       []  Discharge due to:_  [x] Progress hip strength and core stabilization.      Curt Ganser V, PT 9/10/2019 Quality 431: Preventive Care And Screening: Unhealthy Alcohol Use - Screening: Patient not identified as an unhealthy alcohol user when screened for unhealthy alcohol use using a systematic screening method Detail Level: Detailed Quality 130: Documentation Of Current Medications In The Medical Record: Current Medications Documented Quality 226: Preventive Care And Screening: Tobacco Use: Screening And Cessation Intervention: Patient screened for tobacco use and is an ex/non-smoker

## (undated) DEVICE — DRAPE C-ARMOUR C-ARM KIT --

## (undated) DEVICE — INFECTION CONTROL KIT SYS

## (undated) DEVICE — SOLUTION IV 1000ML 0.9% SOD CHL

## (undated) DEVICE — DRAPE,EENT,SPLIT,STERILE: Brand: MEDLINE

## (undated) DEVICE — 1010 S-DRAPE TOWEL DRAPE 10/BX: Brand: STERI-DRAPE™

## (undated) DEVICE — STERILE POLYISOPRENE POWDER-FREE SURGICAL GLOVES: Brand: PROTEXIS

## (undated) DEVICE — TRAY CATH OD16FR SIL URIN M STATLOK STBL DEV SURSTP

## (undated) DEVICE — HANDPIECE SET WITH BONE CLEANING TIP AND SUCTION TUBE: Brand: INTERPULSE

## (undated) DEVICE — SUTURE STRATAFIX SPRL SZ 1 L14IN ABSRB VLT L48CM CTX 1/2 SXPD2B405

## (undated) DEVICE — LAMINECTOMY RICHMOND-LF: Brand: MEDLINE INDUSTRIES, INC.

## (undated) DEVICE — ADHESIVE SKIN CLOSURE 4X44 CM PREMIERPRO EXOFINFUSION DISP

## (undated) DEVICE — PREP SKN PREVAIL 40ML APPL --

## (undated) DEVICE — DRAPE XR C ARM 41X74IN LF --

## (undated) DEVICE — SUTURE STRATAFIX SPRL SZ 2-0 L14IN ABSRB VLT MH L36MM 1/2 SXPD2B412

## (undated) DEVICE — GLOVE RAD ATTENUATING PWD FREE BLK SZ 9 ESP

## (undated) DEVICE — BONE WAX WHITE: Brand: BONE WAX WHITE

## (undated) DEVICE — ROCKER SWITCH PENCIL BLADE ELECTRODE, HOLSTER: Brand: EDGE

## (undated) DEVICE — REM POLYHESIVE ADULT PATIENT RETURN ELECTRODE: Brand: VALLEYLAB

## (undated) DEVICE — ABDOMINAL PAD: Brand: DERMACEA

## (undated) DEVICE — PILLOW POS AD L7IN R FOAM HD REST INTUB SLOT DISP

## (undated) DEVICE — 2.4MM DIA PEDICLE DRILL: Brand: PREFERENCE

## (undated) DEVICE — GOWN,SIRUS,NONRNF,SETINSLV,2XL,18/CS: Brand: MEDLINE

## (undated) DEVICE — Device

## (undated) DEVICE — 4-PORT MANIFOLD: Brand: NEPTUNE 2

## (undated) DEVICE — SUTURE VCRL 1 L27IN ABSRB CT BRAID COAT UD J281H

## (undated) DEVICE — Z DISCONTINUED USE 2716304 SUTURE STRATAFIX SPRL SZ 3-0 L12IN ABSRB UD FS-1 L24MM 3/8

## (undated) DEVICE — 5.0MM ZYPHR ROUND FLUTED: Brand: ZYPHR

## (undated) DEVICE — SUTURE VCRL SZ 0 L36IN ABSRB VLT L40MM CT 1/2 CIR J358H

## (undated) DEVICE — SUTURE STRATAFIX SPRL SZ 1 L5IN ABSRB VLT CT-1 L36MM 1/2 SXPD2B401

## (undated) DEVICE — GAUZE SPONGES,12 PLY: Brand: CURITY

## (undated) DEVICE — COVER,TABLE,HEAVY DUTY,60"X90",STRL: Brand: MEDLINE